# Patient Record
Sex: MALE | Race: BLACK OR AFRICAN AMERICAN | Employment: OTHER | URBAN - METROPOLITAN AREA
[De-identification: names, ages, dates, MRNs, and addresses within clinical notes are randomized per-mention and may not be internally consistent; named-entity substitution may affect disease eponyms.]

---

## 2017-02-06 ENCOUNTER — HOSPITAL ENCOUNTER (OUTPATIENT)
Dept: PREADMISSION TESTING | Age: 67
Discharge: HOME | End: 2017-02-06
Payer: MEDICARE

## 2017-02-06 VITALS
DIASTOLIC BLOOD PRESSURE: 64 MMHG | RESPIRATION RATE: 18 BRPM | WEIGHT: 191.14 LBS | BODY MASS INDEX: 28.31 KG/M2 | OXYGEN SATURATION: 99 % | TEMPERATURE: 97.8 F | HEIGHT: 69 IN | HEART RATE: 63 BPM | SYSTOLIC BLOOD PRESSURE: 132 MMHG

## 2017-02-06 LAB
ABO + RH BLD: NORMAL
ALBUMIN SERPL BCP-MCNC: 4 G/DL (ref 3.5–5)
ALBUMIN/GLOB SERPL: 1.3 {RATIO} (ref 1.1–2.2)
ALP SERPL-CCNC: 87 U/L (ref 45–117)
ALT SERPL-CCNC: 34 U/L (ref 12–78)
ANION GAP BLD CALC-SCNC: 6 MMOL/L (ref 5–15)
APPEARANCE UR: CLEAR
AST SERPL W P-5'-P-CCNC: 20 U/L (ref 15–37)
BACTERIA URNS QL MICRO: NEGATIVE /HPF
BASOPHILS # BLD AUTO: 0 K/UL (ref 0–0.1)
BASOPHILS # BLD: 0 % (ref 0–1)
BILIRUB SERPL-MCNC: 0.8 MG/DL (ref 0.2–1)
BILIRUB UR QL: NEGATIVE
BLOOD GROUP ANTIBODIES SERPL: NORMAL
BUN SERPL-MCNC: 17 MG/DL (ref 6–20)
BUN/CREAT SERPL: 12 (ref 12–20)
CALCIUM SERPL-MCNC: 8.9 MG/DL (ref 8.5–10.1)
CHLORIDE SERPL-SCNC: 105 MMOL/L (ref 97–108)
CO2 SERPL-SCNC: 31 MMOL/L (ref 21–32)
COLOR UR: ABNORMAL
CREAT SERPL-MCNC: 1.47 MG/DL (ref 0.7–1.3)
EOSINOPHIL # BLD: 0.1 K/UL (ref 0–0.4)
EOSINOPHIL NFR BLD: 3 % (ref 0–7)
EPITH CASTS URNS QL MICRO: ABNORMAL /LPF
ERYTHROCYTE [DISTWIDTH] IN BLOOD BY AUTOMATED COUNT: 13.4 % (ref 11.5–14.5)
GLOBULIN SER CALC-MCNC: 3.2 G/DL (ref 2–4)
GLUCOSE SERPL-MCNC: 83 MG/DL (ref 65–100)
GLUCOSE UR STRIP.AUTO-MCNC: NEGATIVE MG/DL
HCT VFR BLD AUTO: 46.1 % (ref 36.6–50.3)
HGB BLD-MCNC: 14.5 G/DL (ref 12.1–17)
HGB UR QL STRIP: NEGATIVE
KETONES UR QL STRIP.AUTO: NEGATIVE MG/DL
LEUKOCYTE ESTERASE UR QL STRIP.AUTO: ABNORMAL
LYMPHOCYTES # BLD AUTO: 28 % (ref 12–49)
LYMPHOCYTES # BLD: 1.3 K/UL (ref 0.8–3.5)
MCH RBC QN AUTO: 28.2 PG (ref 26–34)
MCHC RBC AUTO-ENTMCNC: 31.5 G/DL (ref 30–36.5)
MCV RBC AUTO: 89.7 FL (ref 80–99)
MONOCYTES # BLD: 0.6 K/UL (ref 0–1)
MONOCYTES NFR BLD AUTO: 12 % (ref 5–13)
NEUTS SEG # BLD: 2.7 K/UL (ref 1.8–8)
NEUTS SEG NFR BLD AUTO: 57 % (ref 32–75)
NITRITE UR QL STRIP.AUTO: NEGATIVE
PH UR STRIP: 6 [PH] (ref 5–8)
PLATELET # BLD AUTO: 270 K/UL (ref 150–400)
POTASSIUM SERPL-SCNC: 4.7 MMOL/L (ref 3.5–5.1)
PROT SERPL-MCNC: 7.2 G/DL (ref 6.4–8.2)
PROT UR STRIP-MCNC: NEGATIVE MG/DL
RBC # BLD AUTO: 5.14 M/UL (ref 4.1–5.7)
RBC #/AREA URNS HPF: ABNORMAL /HPF (ref 0–5)
SODIUM SERPL-SCNC: 142 MMOL/L (ref 136–145)
SP GR UR REFRACTOMETRY: 1.02 (ref 1–1.03)
SPECIMEN EXP DATE BLD: NORMAL
UA: UC IF INDICATED,UAUC: ABNORMAL
UROBILINOGEN UR QL STRIP.AUTO: 1 EU/DL (ref 0.2–1)
WBC # BLD AUTO: 4.8 K/UL (ref 4.1–11.1)
WBC URNS QL MICRO: ABNORMAL /HPF (ref 0–4)

## 2017-02-06 PROCEDURE — 80053 COMPREHEN METABOLIC PANEL: CPT | Performed by: UROLOGY

## 2017-02-06 PROCEDURE — 36415 COLL VENOUS BLD VENIPUNCTURE: CPT | Performed by: UROLOGY

## 2017-02-06 PROCEDURE — 85025 COMPLETE CBC W/AUTO DIFF WBC: CPT | Performed by: UROLOGY

## 2017-02-06 PROCEDURE — 86900 BLOOD TYPING SEROLOGIC ABO: CPT | Performed by: UROLOGY

## 2017-02-06 PROCEDURE — 81001 URINALYSIS AUTO W/SCOPE: CPT | Performed by: UROLOGY

## 2017-02-06 NOTE — PERIOP NOTES
[facility]  PREOPERATIVE INSTRUCTIONS    Surgery Date:   Monday, 2/13/17  Surgery arrival time given by surgeon: NO   If no,[facility] [address] staff will call you between 4 PM- 8 PM the day before surgery with your arrival time. If your surgery is on a Monday, we will call you the preceding Friday. Please call [phone] after 8 PM if you did not receive your arrival time. 1. Please report at the designated time to the [address] [place] [address] [facility]. Bring your insurance card, photo identification, and any copayment ( if applicable). 2. You must have a responsible adult to drive you home. You need to have a responsible adult to stay with you the first 24 hours after surgery if you are going home the same day of your surgery and you should not drive a car for 24 hours following your surgery. 3. Nothing to eat or drink after midnight the night before surgery. This includes no water, gum, mints, coffee, juice, etc.  Please note special instructions, if applicable, below for medications. 4. MEDICATIONS TO TAKE THE MORNING OF SURGERY WITH A SIP OF WATER: NONE  5. No alcoholic beverages 24 hours before or after your surgery. 6. If you are being admitted to the hospital,please leave personal belongings/luggage in your car until you have an assigned hospital room number. 7. Stop Aspirin (continue as instructed) and/or any non-steroidal anti-inflammatory drugs (i.e. Ibuprofen, Naproxen, Advil, Aleve) as directed by your surgeon. You may take Tylenol. Stop herbal supplements 1 week prior to  surgery. 8. If you are currently taking Plavix, Coumadin,or any other blood-thinning/anticoagulant medication contact your surgeon for instructions. 9. Please wear comfortable clothes. Wear your glasses instead of contacts. We ask that all money, jewelry and valuables be left at home. Wear no make up, particularly mascara, the day of surgery. 10.  All body piercings, rings,and jewelry need to be removed and left at home.     Please wear your hair loose or down. Please no pony-tails, buns, or any metal hair accessories. If you shower the morning of surgery, please do not apply any lotions, powders, or deodorants afterwards. Do not shave any body area within 24 hours of your surgery. 11. Please follow all instructions to avoid any potential surgical cancellation. 12.  Should your physical condition change, (i.e. fever, cold, flu, etc.) please notify your surgeon as soon as possible. 13. It is important to be on time. If a situation occurs where you may be delayed, please call:  [phone] / [phone] on the day of surgery. 14. The [facility] staff can be reached at [phone]. .  15. Special instructions: pain scale, free  parking 7-5 pm, BRING medication list with last dose taken, Bring cpap to hospital    The patient and spouse was contacted  in person. He  verbalize  understanding of all instructions does not  need reinforcement.

## 2017-02-06 NOTE — H&P
PAT Pre-Op History & Physical    Patient: Carolina Islas                  MRN: [id number]          SSN: [id number]  YOB: 1950          Age: 77 y.o. Sex: male                Subjective:   Patient is a 77 y.o.  male who presents with a diagnosis of prostate cancer 10/28/16 following biopsy. Pre treatment PSA 6.3ng/ml. Complaint of nocturia and mild erectile dysfunction per urology note. The patient was evaluated in the surgeon's office and it was determined that the most appropriate plan of care is to proceed with surgical intervention. Patient's PCP Queta Jerry MD    Followed by cardiology for history of CAD, s/p coronary stent to LAD 1999 and 2009. Note- Dr. Ariana Benjamin in [facility], with Plavix and aspirin plan. Recommended stopping plavix 5-7 days prior and remaining on aspirin. Dr. Chikis Randolph notified and in agreement. Note from Dr. Chikis Randolph to stop Plavix 7 days and remain on aspirin on chart. Past Medical History   Diagnosis Date    CAD (coronary artery disease) 1999, 2009     hx of coronary stent- singe vessel- LAD    Cancer ([facility] [address]      prostate    Essential hypertension     GERD (gastroesophageal reflux disease)     Hyperlipidemia     Sleep apnea      CPAP complaint      Past Surgical History   Procedure Laterality Date    Hx ptca       Stent of the LAD in 1999 and again in 2009 with a 3 x 28 mm on 12/21/09 post dilated to 3.25    Hx heart catheterization  1999, & 12/21/2009     LAD    Hx urological  10/2016     prostate biopsy    Hx cataract removal Bilateral       Prior to Admission medications    Medication Sig Start Date End Date Taking? Authorizing Provider   enalapril (VASOTEC) 2.5 mg tablet Take 1 Tab by mouth daily. 10/31/16  Yes Awais Vargas MD   simvastatin (ZOCOR) 20 mg tablet Take 1 Tab by mouth nightly. 10/31/16  Yes Awais Vargas MD   clopidogrel (PLAVIX) 75 mg tablet Take 1 Tab by mouth daily.  10/31/16  Yes Xuan Sellers MD [facility]   pantoprazole (PROTONIX) 40 mg tablet Take 40 mg by mouth daily (with breakfast). Patient takes with food, breakfast   Yes Historical Provider   nitroglycerin (NITROSTAT) 0.4 mg SL tablet 1 Tab by SubLINGual route every five (5) minutes as needed. 12/8/10  Yes Daren Velez MD   aspirin 81 mg tablet Take  by mouth daily. Yes Historical Provider     Current Outpatient Prescriptions   Medication Sig    enalapril (VASOTEC) 2.5 mg tablet Take 1 Tab by mouth daily.  simvastatin (ZOCOR) 20 mg tablet Take 1 Tab by mouth nightly.  clopidogrel (PLAVIX) 75 mg tablet Take 1 Tab by mouth daily.  pantoprazole (PROTONIX) 40 mg tablet Take 40 mg by mouth daily (with breakfast). Patient takes with food, breakfast    nitroglycerin (NITROSTAT) 0.4 mg SL tablet 1 Tab by SubLINGual route every five (5) minutes as needed.  aspirin 81 mg tablet Take  by mouth daily. No current facility-administered medications for this encounter. No Known Allergies   Social History   Substance Use Topics    Smoking status: Former Smoker     Packs/day: 1.00     Years: 30.00     Quit date: 1999    Smokeless tobacco: Never Used    Alcohol use No      Comment: none since 1999      History   Drug Use No     Comment: none since 1999- hx of casual use marijuana and cocaine     Family History   Problem Relation Age of Onset    Hypertension Mother     Hypertension Father     Heart Disease Father 79     MI    Hypertension Sister     Hypertension Brother     Hypertension Brother         Review of Systems    Patient denies visual disturbances, mouth sores, and loose teeth. Denies fever, chills and sweats. Denies cough, shortness of breath, and wheezes. Denies chest pain, tightness, pain radiating down left arm, palpitations, dizziness, and lightheadedness,  Denies diarrhea, constipation, and abdominal pain. Complaint of intermittent hesitancy, otherwise denies urgency or incontinence.  Denies joint pain, back pain and weakness. Denies skin breakdown, rashes, insect bites or open area. Denies excessive urination, excessive thirst, fatigue and malaise. Objective:     Patient Vitals for the past 24 hrs:   Temp Pulse Resp BP SpO2   17 1003 97.8 °F (36.6 °C) 63 18 132/64 99 %     Wt Readings from Last 1 Encounters:   17 86.7 kg (191 lb 2.2 oz)     Body mass index is 28.23 kg/(m^2). Temp (24hrs), Av.8 °F (36.6 °C), Min:97.8 °F (36.6 °C), Max:97.8 °F (36.6 °C)      Physical Exam:     General: Pleasant, cooperative, no apparent distress, appears stated age. Eyes: Conjunctivae/corneas clear. EOMs intact. Nose: Nares normal.   Mouth/Throat: Lips, mucosa, and tongue normal. Upper and lower partials in place. Neck: Supple, symmetrical, trachea midline. No carotid bruits. Normal ROM in neck. Back: Symmetric. Lungs: Clear to auscultation bilaterally. Heart: Regular rate and rhythm, S1, S2 normal. No murmur, click, rub or gallop. Abdomen: Soft, non-tender. No distension. Bowel sounds normal.       Musculoskeletal:  Normal strength in all 4 extremities. Extremities:  Extremities normal, atraumatic, no cyanosis or edema. Calves supple, non tender to palpation. Pulses: 2+ and symmetric bilateral upper extremities. Cap. refill <2 seconds   Skin: Skin color, texture, turgor normal.  No rashes or lesions. Lymph nodes: No adenopathy. Neurologic: Alert and oriented to person, place and time. CN II-XII grossly intact.       Labs:   Recent Results (from the past 72 hour(s))   CBC WITH AUTOMATED DIFF    Collection Time: 17 10:50 AM   Result Value Ref Range    WBC 4.8 4.1 - 11.1 K/uL    RBC 5.14 4.10 - 5.70 M/uL    HGB 14.5 12.1 - 17.0 g/dL    HCT 46.1 36.6 - 50.3 %    MCV 89.7 80.0 - 99.0 FL    MCH 28.2 26.0 - 34.0 PG    MCHC 31.5 30.0 - 36.5 g/dL    RDW 13.4 11.5 - 14.5 %    PLATELET 101 583 - 557 K/uL    NEUTROPHILS 57 32 - 75 %    LYMPHOCYTES 28 12 - 49 %    MONOCYTES 12 5 - 13 %    EOSINOPHILS 3 0 - 7 %    BASOPHILS 0 0 - 1 %    ABS. NEUTROPHILS 2.7 1.8 - 8.0 K/UL    ABS. LYMPHOCYTES 1.3 0.8 - 3.5 K/UL    ABS. MONOCYTES 0.6 0.0 - 1.0 K/UL    ABS. EOSINOPHILS 0.1 0.0 - 0.4 K/UL    ABS. BASOPHILS 0.0 0.0 - 0.1 K/UL   METABOLIC PANEL, COMPREHENSIVE    Collection Time: 02/06/17 10:50 AM   Result Value Ref Range    Sodium 142 136 - 145 mmol/L    Potassium 4.7 3.5 - 5.1 mmol/L    Chloride 105 97 - 108 mmol/L    CO2 31 21 - 32 mmol/L    Anion gap 6 5 - 15 mmol/L    Glucose 83 65 - 100 mg/dL    BUN 17 6 - 20 MG/DL    Creatinine 1.47 (H) 0.70 - 1.30 MG/DL    BUN/Creatinine ratio 12 12 - 20      GFR est AA 58 (L) >60 ml/min/1.73m2    GFR est non-AA 48 (L) >60 ml/min/1.73m2    Calcium 8.9 8.5 - 10.1 MG/DL    Bilirubin, total 0.8 0.2 - 1.0 MG/DL    ALT (SGPT) 34 12 - 78 U/L    AST (SGOT) 20 15 - 37 U/L    Alk. phosphatase 87 45 - 117 U/L    Protein, total 7.2 6.4 - 8.2 g/dL    Albumin 4.0 3.5 - 5.0 g/dL    Globulin 3.2 2.0 - 4.0 g/dL    A-G Ratio 1.3 1.1 - 2.2     URINALYSIS W/ REFLEX CULTURE    Collection Time: 02/06/17 10:50 AM   Result Value Ref Range    Color YELLOW/STRAW      Appearance CLEAR CLEAR      Specific gravity 1.023 1.003 - 1.030      pH (UA) 6.0 5.0 - 8.0      Protein NEGATIVE  NEG mg/dL    Glucose NEGATIVE  NEG mg/dL    Ketone NEGATIVE  NEG mg/dL    Bilirubin NEGATIVE  NEG      Blood NEGATIVE  NEG      Urobilinogen 1.0 0.2 - 1.0 EU/dL    Nitrites NEGATIVE  NEG      Leukocyte Esterase SMALL (A) NEG      WBC 0-4 0 - 4 /hpf    RBC 0-5 0 - 5 /hpf    Epithelial cells FEW FEW /lpf    Bacteria NEGATIVE  NEG /hpf    UA:UC IF INDICATED CULTURE NOT INDICATED BY UA RESULT CNI     TYPE & SCREEN    Collection Time: 02/06/17 10:50 AM   Result Value Ref Range    Crossmatch Expiration 02/16/2017     ABO/Rh(D) B POSITIVE     Antibody screen NEG        Assessment:     PROSTATE CA     Sleep apnea    Plan:     Scheduled for -DAVINCI RADICAL PROSTATECTOMY PELVIC LYMPH NODE DISSECTION    Labs done per surgeon's orders.  Labs reviewed, unremarkable  except- creatinine elevated at 1.47, GFR slightly decreased at 58. Prior labs available in [facility] show creatinine 1.3 and 1.4 in 2009, abnormal labs faxed to surgeon- See PAT nurse Documentation. EKG done 10/20/16 with cardiology review- interpretation, Dr. Sharad Becerra, in note 10/20/17 in [facility]. Plavix/ aspirin plan on chart- to stop plavix 7 days prior and continue on aspirin. Per cardiology and Dr. Sun Catalan in agreement- note on chart.      Advised to bring CPAP Kunal 33, NP

## 2017-02-12 ENCOUNTER — ANESTHESIA EVENT (OUTPATIENT)
Dept: SURGERY | Age: 67
DRG: 708 | End: 2017-02-12
Payer: MEDICARE

## 2017-02-13 ENCOUNTER — ANESTHESIA (OUTPATIENT)
Dept: SURGERY | Age: 67
DRG: 708 | End: 2017-02-13
Payer: MEDICARE

## 2017-02-13 PROBLEM — C61 PROSTATE CANCER (HCC): Status: ACTIVE | Noted: 2017-02-13

## 2017-02-13 PROCEDURE — 77030019908 HC STETH ESOPH SIMS -A: Performed by: ANESTHESIOLOGY

## 2017-02-13 PROCEDURE — 77030008771 HC TU NG SALEM SUMP -A: Performed by: ANESTHESIOLOGY

## 2017-02-13 PROCEDURE — 74011000258 HC RX REV CODE- 258

## 2017-02-13 PROCEDURE — 77030026438 HC STYL ET INTUB CARD -A: Performed by: ANESTHESIOLOGY

## 2017-02-13 PROCEDURE — 77030008684 HC TU ET CUF COVD -B: Performed by: ANESTHESIOLOGY

## 2017-02-13 PROCEDURE — 77030013079 HC BLNKT BAIR HGGR 3M -A: Performed by: ANESTHESIOLOGY

## 2017-02-13 PROCEDURE — 74011250636 HC RX REV CODE- 250/636

## 2017-02-13 PROCEDURE — 74011000250 HC RX REV CODE- 250

## 2017-02-13 PROCEDURE — 74011250636 HC RX REV CODE- 250/636: Performed by: ANESTHESIOLOGY

## 2017-02-13 RX ORDER — SUCCINYLCHOLINE CHLORIDE 20 MG/ML
INJECTION INTRAMUSCULAR; INTRAVENOUS AS NEEDED
Status: DISCONTINUED | OUTPATIENT
Start: 2017-02-13 | End: 2017-02-13 | Stop reason: HOSPADM

## 2017-02-13 RX ORDER — GLYCOPYRROLATE 0.2 MG/ML
INJECTION INTRAMUSCULAR; INTRAVENOUS AS NEEDED
Status: DISCONTINUED | OUTPATIENT
Start: 2017-02-13 | End: 2017-02-13 | Stop reason: HOSPADM

## 2017-02-13 RX ORDER — PROPOFOL 10 MG/ML
INJECTION, EMULSION INTRAVENOUS AS NEEDED
Status: DISCONTINUED | OUTPATIENT
Start: 2017-02-13 | End: 2017-02-13 | Stop reason: HOSPADM

## 2017-02-13 RX ORDER — NEOSTIGMINE METHYLSULFATE 1 MG/ML
INJECTION INTRAVENOUS AS NEEDED
Status: DISCONTINUED | OUTPATIENT
Start: 2017-02-13 | End: 2017-02-13 | Stop reason: HOSPADM

## 2017-02-13 RX ORDER — CEFAZOLIN SODIUM IN 0.9 % NACL 2 G/100 ML
PLASTIC BAG, INJECTION (ML) INTRAVENOUS AS NEEDED
Status: DISCONTINUED | OUTPATIENT
Start: 2017-02-13 | End: 2017-02-13 | Stop reason: HOSPADM

## 2017-02-13 RX ORDER — DEXAMETHASONE SODIUM PHOSPHATE 4 MG/ML
INJECTION, SOLUTION INTRA-ARTICULAR; INTRALESIONAL; INTRAMUSCULAR; INTRAVENOUS; SOFT TISSUE AS NEEDED
Status: DISCONTINUED | OUTPATIENT
Start: 2017-02-13 | End: 2017-02-13 | Stop reason: HOSPADM

## 2017-02-13 RX ORDER — LIDOCAINE HYDROCHLORIDE 20 MG/ML
INJECTION, SOLUTION EPIDURAL; INFILTRATION; INTRACAUDAL; PERINEURAL AS NEEDED
Status: DISCONTINUED | OUTPATIENT
Start: 2017-02-13 | End: 2017-02-13 | Stop reason: HOSPADM

## 2017-02-13 RX ORDER — FENTANYL CITRATE 50 UG/ML
INJECTION, SOLUTION INTRAMUSCULAR; INTRAVENOUS AS NEEDED
Status: DISCONTINUED | OUTPATIENT
Start: 2017-02-13 | End: 2017-02-13 | Stop reason: HOSPADM

## 2017-02-13 RX ORDER — SODIUM CHLORIDE, SODIUM LACTATE, POTASSIUM CHLORIDE, CALCIUM CHLORIDE 600; 310; 30; 20 MG/100ML; MG/100ML; MG/100ML; MG/100ML
INJECTION, SOLUTION INTRAVENOUS
Status: DISCONTINUED | OUTPATIENT
Start: 2017-02-13 | End: 2017-02-13 | Stop reason: HOSPADM

## 2017-02-13 RX ORDER — ONDANSETRON 2 MG/ML
INJECTION INTRAMUSCULAR; INTRAVENOUS AS NEEDED
Status: DISCONTINUED | OUTPATIENT
Start: 2017-02-13 | End: 2017-02-13 | Stop reason: HOSPADM

## 2017-02-13 RX ORDER — ROCURONIUM BROMIDE 10 MG/ML
INJECTION, SOLUTION INTRAVENOUS AS NEEDED
Status: DISCONTINUED | OUTPATIENT
Start: 2017-02-13 | End: 2017-02-13 | Stop reason: HOSPADM

## 2017-02-13 RX ORDER — MIDAZOLAM HYDROCHLORIDE 1 MG/ML
INJECTION, SOLUTION INTRAMUSCULAR; INTRAVENOUS AS NEEDED
Status: DISCONTINUED | OUTPATIENT
Start: 2017-02-13 | End: 2017-02-13 | Stop reason: HOSPADM

## 2017-02-13 RX ADMIN — GLYCOPYRROLATE 0.3 MG: 0.2 INJECTION INTRAMUSCULAR; INTRAVENOUS at 16:01

## 2017-02-13 RX ADMIN — ROCURONIUM BROMIDE 10 MG: 10 INJECTION, SOLUTION INTRAVENOUS at 14:26

## 2017-02-13 RX ADMIN — FENTANYL CITRATE 50 MCG: 50 INJECTION, SOLUTION INTRAMUSCULAR; INTRAVENOUS at 13:50

## 2017-02-13 RX ADMIN — SODIUM CHLORIDE, SODIUM LACTATE, POTASSIUM CHLORIDE, CALCIUM CHLORIDE: 600; 310; 30; 20 INJECTION, SOLUTION INTRAVENOUS at 12:41

## 2017-02-13 RX ADMIN — SODIUM CHLORIDE, SODIUM LACTATE, POTASSIUM CHLORIDE, AND CALCIUM CHLORIDE: 600; 310; 30; 20 INJECTION, SOLUTION INTRAVENOUS at 15:26

## 2017-02-13 RX ADMIN — LIDOCAINE HYDROCHLORIDE 100 MG: 20 INJECTION, SOLUTION EPIDURAL; INFILTRATION; INTRACAUDAL; PERINEURAL at 13:20

## 2017-02-13 RX ADMIN — FENTANYL CITRATE 100 MCG: 50 INJECTION, SOLUTION INTRAMUSCULAR; INTRAVENOUS at 13:20

## 2017-02-13 RX ADMIN — ROCURONIUM BROMIDE 10 MG: 10 INJECTION, SOLUTION INTRAVENOUS at 13:20

## 2017-02-13 RX ADMIN — FENTANYL CITRATE 50 MCG: 50 INJECTION, SOLUTION INTRAMUSCULAR; INTRAVENOUS at 16:01

## 2017-02-13 RX ADMIN — ROCURONIUM BROMIDE 20 MG: 10 INJECTION, SOLUTION INTRAVENOUS at 13:31

## 2017-02-13 RX ADMIN — MIDAZOLAM HYDROCHLORIDE 2 MG: 1 INJECTION, SOLUTION INTRAMUSCULAR; INTRAVENOUS at 13:13

## 2017-02-13 RX ADMIN — ROCURONIUM BROMIDE 10 MG: 10 INJECTION, SOLUTION INTRAVENOUS at 14:55

## 2017-02-13 RX ADMIN — Medication 2 G: at 13:37

## 2017-02-13 RX ADMIN — NEOSTIGMINE METHYLSULFATE 2 MG: 1 INJECTION INTRAVENOUS at 16:01

## 2017-02-13 RX ADMIN — ONDANSETRON 4 MG: 2 INJECTION INTRAMUSCULAR; INTRAVENOUS at 13:13

## 2017-02-13 RX ADMIN — FENTANYL CITRATE 50 MCG: 50 INJECTION, SOLUTION INTRAMUSCULAR; INTRAVENOUS at 16:02

## 2017-02-13 RX ADMIN — DEXAMETHASONE SODIUM PHOSPHATE 8 MG: 4 INJECTION, SOLUTION INTRA-ARTICULAR; INTRALESIONAL; INTRAMUSCULAR; INTRAVENOUS; SOFT TISSUE at 13:13

## 2017-02-13 RX ADMIN — SUCCINYLCHOLINE CHLORIDE 100 MG: 20 INJECTION INTRAMUSCULAR; INTRAVENOUS at 13:20

## 2017-02-13 RX ADMIN — ROCURONIUM BROMIDE 10 MG: 10 INJECTION, SOLUTION INTRAVENOUS at 14:00

## 2017-02-13 RX ADMIN — PROPOFOL 200 MG: 10 INJECTION, EMULSION INTRAVENOUS at 13:20

## 2017-02-13 NOTE — ANESTHESIA PREPROCEDURE EVALUATION
Anesthetic History   No history of anesthetic complications            Review of Systems / Medical History  Patient summary reviewed, nursing notes reviewed and pertinent labs reviewed    Pulmonary        Sleep apnea           Neuro/Psych   Within defined limits           Cardiovascular    Hypertension          CAD and cardiac stents    Exercise tolerance: >4 METS     GI/Hepatic/Renal     GERD           Endo/Other        Cancer     Other Findings   Comments: Prostate ca         Physical Exam    Airway  Mallampati: II    Neck ROM: normal range of motion   Mouth opening: Normal     Cardiovascular  Regular rate and rhythm,  S1 and S2 normal,  no murmur, click, rub, or gallop  Rhythm: regular  Rate: normal         Dental    Dentition: Upper partial plate and Lower partial plate     Pulmonary  Breath sounds clear to auscultation               Abdominal  GI exam deferred       Other Findings            Anesthetic Plan    ASA: 3  Anesthesia type: general          Induction: Intravenous  Anesthetic plan and risks discussed with: Patient

## 2017-02-13 NOTE — ANESTHESIA POSTPROCEDURE EVALUATION
Post-Anesthesia Evaluation and Assessment    Patient: Kendy Thompson MRN: [id number]  SSN: [id number]    YOB: 1950  Age: 77 y.o. Sex: male       Cardiovascular Function/Vital Signs  Visit Vitals    /65    Pulse 80    Temp 36.4 °C (97.5 °F)    Resp 16    Ht 5' 9\" (1.753 m)    Wt 87 kg (191 lb 12.8 oz)    SpO2 100%    BMI 28.32 kg/m2       Patient is status post general anesthesia for Procedure(s):  [address], BILATERAL PELVIC LYMPH NODE DISSECTION. Nausea/Vomiting: None    Postoperative hydration reviewed and adequate. Pain:  Pain Scale 1: Numeric (0 - 10) (02/13/17 1738)  Pain Intensity 1: 3 (02/13/17 1738)   Managed    Neurological Status:   Neuro (WDL): Exceptions to WDL (02/13/17 1635)  Neuro  Neurologic State: Drowsy; Eyes open spontaneously (02/13/17 1635)   At baseline    Mental Status and Level of Consciousness: Arousable    Pulmonary Status:   O2 Device: Nasal cannula (02/13/17 1635)   Adequate oxygenation and airway patent    Complications related to anesthesia: None    Post-anesthesia assessment completed.  No concerns    Signed By: Sunita Meade DO     February 13, 2017

## 2017-05-11 ENCOUNTER — OFFICE VISIT (OUTPATIENT)
Dept: CARDIOLOGY CLINIC | Age: 67
End: 2017-05-11

## 2017-05-11 VITALS
OXYGEN SATURATION: 98 % | HEIGHT: 69 IN | BODY MASS INDEX: 27.67 KG/M2 | WEIGHT: 186.8 LBS | SYSTOLIC BLOOD PRESSURE: 130 MMHG | HEART RATE: 64 BPM | DIASTOLIC BLOOD PRESSURE: 62 MMHG

## 2017-05-11 DIAGNOSIS — I10 ESSENTIAL HYPERTENSION, BENIGN: ICD-10-CM

## 2017-05-11 DIAGNOSIS — E78.00 PURE HYPERCHOLESTEROLEMIA: Primary | ICD-10-CM

## 2017-05-11 DIAGNOSIS — I25.10 ATHEROSCLEROSIS OF NATIVE CORONARY ARTERY OF NATIVE HEART WITHOUT ANGINA PECTORIS: ICD-10-CM

## 2017-05-11 RX ORDER — SIMVASTATIN 20 MG/1
20 TABLET, FILM COATED ORAL
Qty: 90 TAB | Refills: 3 | Status: SHIPPED | OUTPATIENT
Start: 2017-05-11 | End: 2018-04-20 | Stop reason: SDUPTHER

## 2017-05-11 RX ORDER — CLOPIDOGREL BISULFATE 75 MG/1
75 TABLET ORAL DAILY
Qty: 90 TAB | Refills: 3 | Status: SHIPPED | OUTPATIENT
Start: 2017-05-11 | End: 2018-04-20 | Stop reason: SDUPTHER

## 2017-05-11 RX ORDER — ENALAPRIL MALEATE 2.5 MG/1
2.5 TABLET ORAL DAILY
Qty: 90 TAB | Refills: 3 | Status: SHIPPED | OUTPATIENT
Start: 2017-05-11 | End: 2018-04-20 | Stop reason: SDUPTHER

## 2017-05-11 RX ORDER — NITROGLYCERIN 0.4 MG/1
0.4 TABLET SUBLINGUAL
Qty: 25 TAB | Refills: 3 | Status: SHIPPED | OUTPATIENT
Start: 2017-05-11 | End: 2019-06-13 | Stop reason: SDUPTHER

## 2017-05-11 NOTE — PROGRESS NOTES
LAST OFFICE VISIT : 10/20/2016        ICD-10-CM ICD-9-CM   1. Pure hypercholesterolemia E78.00 272.0   2. Atherosclerosis of native coronary artery of native heart without angina pectoris I25.10 414.01   3. Essential hypertension, benign I10 401.1            Jarrell Daley is a 79 y.o. male with HTN, dyslipidemia, CAD s/p stenting  referred for follow up evaluation. Cardiac risk factors: dyslipidemia, male gender, hypertension  I have personally obtained the history from the patient. HISTORY OF PRESENTING ILLNESS      Mr. Anne Marie Shaw is doing well with no interval issues. He is normotensive at home. Adherent with BP medications. Denies any lightheadedness or dizziness. He remains active with no exertional symptoms. He does not pursue any structured exercise. Denies any exertional symptoms. The patient denies chest pain/ shortness of breath, orthopnea, PND, LE edema, palpitations, syncope, presyncope or fatigue.            ACTIVE PROBLEM LIST     Patient Active Problem List    Diagnosis Date Noted    Prostate cancer ([facility] 02/13/2017    Coronary atherosclerosis of native coronary artery 10/15/2010    Essential hypertension, benign 10/15/2010    Pure hypercholesterolemia 10/15/2010           PAST MEDICAL HISTORY     Past Medical History:   Diagnosis Date    CAD (coronary artery disease) 1999, 2009    hx of coronary stent- singe vessel- LAD    Cancer ([facility] [address]     prostate    Essential hypertension     GERD (gastroesophageal reflux disease)     Hyperlipidemia     Sleep apnea     CPAP complaint           PAST SURGICAL HISTORY     Past Surgical History:   Procedure Laterality Date    HX CATARACT REMOVAL Bilateral     HX HEART CATHETERIZATION  1999, & 12/21/2009    LAD    HX PTCA      Stent of the LAD in 1999 and again in 2009 with a 3 x 28 mm on 12/21/09 post dilated to 3.25    HX UROLOGICAL  10/2016    prostate biopsy          ALLERGIES     No Known Allergies       FAMILY HISTORY     Family History   Problem Relation Age of Onset    Hypertension Mother     Hypertension Father     Heart Disease Father 79     MI    Hypertension Sister     Hypertension Brother     Hypertension Brother     negative for cardiac disease       SOCIAL HISTORY     Social History     Social History    Marital status:      Spouse name: N/A    Number of children: N/A    Years of education: N/A     Social History Main Topics    Smoking status: Former Smoker     Packs/day: 1.00     Years: 30.00     Quit date: 1999    Smokeless tobacco: Never Used    Alcohol use No    Drug use: No      Comment: none since 1999- hx of casual use marijuana and cocaine    Sexual activity: Not Asked     Other Topics Concern    None     Social History Narrative         MEDICATIONS     Current Outpatient Prescriptions   Medication Sig    enalapril (VASOTEC) 2.5 mg tablet Take 1 Tab by mouth daily.  simvastatin (ZOCOR) 20 mg tablet Take 1 Tab by mouth nightly.  clopidogrel (PLAVIX) 75 mg tablet Take 1 Tab by mouth daily.  pantoprazole (PROTONIX) 40 mg tablet Take 40 mg by mouth daily (with breakfast). Patient takes with food, breakfast    nitroglycerin (NITROSTAT) 0.4 mg SL tablet 1 Tab by SubLINGual route every five (5) minutes as needed.  aspirin 81 mg tablet Take  by mouth daily. No current facility-administered medications for this visit. I have reviewed the nurses notes, vitals, problem list, allergy list, medical history, family, social history and medications. REVIEW OF SYMPTOMS      General: Pt denies excessive weight gain or loss. Pt is able to conduct ADL's  HEENT: Denies blurred vision, headaches, hearing loss, epistaxis and difficulty swallowing. Respiratory: Denies cough, congestion, shortness of breath, DELONG, wheezing or stridor.   Cardiovascular: Denies precordial pain, palpitations, edema or PND  Gastrointestinal: Denies poor appetite, indigestion, abdominal pain or blood in stool  Genitourinary: Denies hematuria, dysuria, increased urinary frequency  Musculoskeletal: Denies joint pain or swelling from muscles or joints  Neurologic: Denies tremor, paresthesias, headache, or sensory motor disturbance  Psychiatric: Denies confusion, insomnia, depression  Integumentray: Denies rash, itching or ulcers. Hematologic: Denies easy bruising, bleeding     PHYSICAL EXAMINATION      Vitals:    05/11/17 0943   BP: 130/62   Pulse: 64   SpO2: 98%   Weight: 186 lb 12.8 oz (84.7 kg)   Height: 5' 9\" (1.753 m)     General: Well developed, in no acute distress. HEENT: No jaundice, oral mucosa moist, no oral ulcers  Neck: Supple, no stiffness, no lymphadenopathy, supple  Heart:  Normal S1/S2 negative S3 or S4. Regular, no murmur, gallop or rub, no jugular venous distention  Respiratory: Clear bilaterally x 4, no wheezing or rales  Abdomen:   Soft, non-tender, bowel sounds are active.   Extremities:  No edema, normal cap refill, no cyanosis. Musculoskeletal: No clubbing, no deformities  Neuro: A&Ox3, speech clear, gait stable, cooperative, no focal neurologic deficits  Skin: Skin color is normal. No rashes or lesions. Non diaphoretic, moist.  Vascular: 2+ pulses symmetric in all extremities         DIAGNOSTIC DATA   1. Echo  4/5/06 - EF 55%, RVSP 31 mmHg    2. Myocardial perfusion study  12/3/09 - Abnormal myocardial perfusion of the LAD territory with EF 55%    3. Cardiac catheterization  12/21/09 - Normal hemodynamics, Single vessel coronary artery disease of the LAD, EF 50% with regional wall motion abnormalities. LAD (99% in stent restenosis from 1999),RCA(insig),LCX(insig)    4.  Lipids  4/14/15 - , HDL 34, LDL 76, TG 96  10/20/16- , HDL 39, LDL 76, TG 79       LABORATORY DATA            Lab Results   Component Value Date/Time    WBC 11.3 02/14/2017 03:15 AM    HGB 13.7 02/14/2017 03:15 AM    HCT 41.8 02/14/2017 03:15 AM    PLATELET 625 21/25/3726 03:15 AM    MCV 87.6 02/14/2017 03:15 AM Lab Results   Component Value Date/Time    Sodium 140 02/14/2017 03:15 AM    Potassium 4.1 02/14/2017 03:15 AM    Chloride 105 02/14/2017 03:15 AM    CO2 26 02/14/2017 03:15 AM    Anion gap 9 02/14/2017 03:15 AM    Glucose 130 02/14/2017 03:15 AM    BUN 12 02/14/2017 03:15 AM    Creatinine 1.48 02/14/2017 03:15 AM    BUN/Creatinine ratio 8 02/14/2017 03:15 AM    GFR est AA 58 02/14/2017 03:15 AM    GFR est non-AA 48 02/14/2017 03:15 AM    Calcium 8.1 02/14/2017 03:15 AM    Bilirubin, total 0.8 02/06/2017 10:50 AM    AST (SGOT) 20 02/06/2017 10:50 AM    Alk. phosphatase 87 02/06/2017 10:50 AM    Protein, total 7.2 02/06/2017 10:50 AM    Albumin 4.0 02/06/2017 10:50 AM    Globulin 3.2 02/06/2017 10:50 AM    A-G Ratio 1.3 02/06/2017 10:50 AM    ALT (SGPT) 34 02/06/2017 10:50 AM           ASSESSMENT/RECOMMENDATIONS:.      1. CAD s/p stenting  -He's doing well with no chest discomfort. No exercising regularly, and we had a long discussion about the importance of exercise for better CAD control.   -Continue risk factor modification. No cardiac testing needed at this time. 2. HTN  - BP under good control on current medical regimen. No adjustments to antihypertensives. 3. Dyslipidemia  -Lipids have been at goal, but have not been checked recently. I have ordered cholesterol profile on him. 4. Return in 6 months or sooner PRN. No orders of the defined types were placed in this encounter. Follow-up Disposition:  Return in about 6 months (around 11/11/2017). I have discussed the diagnosis with  Cole Mishra and the intended plan as seen in the above orders. Questions were answered concerning future plans. I have discussed medication side effects and warnings with the patient as well. Thank you,  Acacia Olmedo MD for involving me in the care of  Cole Mishra. Please do not hesitate to contact me for further questions/concerns.      Written by Gaby Islas, as dictated by Jenni Disla MD.    Armando Castleman. MD [facility], [address]      [place] [facility], [address] [place] [address] Drive      [phone] / [phone] Fax

## 2017-05-11 NOTE — PROGRESS NOTES
Medication changes are per verbal order of Dr. Brijesh Virgen.    Visit Vitals    /62 (BP 1 Location: Left arm)    Pulse 64    Ht 5' 9\" (1.753 m)    Wt 186 lb 12.8 oz (84.7 kg)    SpO2 98%    BMI 27.59 kg/m2

## 2017-05-11 NOTE — MR AVS SNAPSHOT
Visit Information Date & Time Provider Department Dept. Phone Encounter #  
 5/11/2017  9:40 AM Diann Meza MD [facility] [phone] [id number] Follow-up Instructions Return in about 6 months (around 11/11/2017). Follow-up and Disposition History Upcoming Health Maintenance Date Due Hepatitis C Screening 1950 DTaP/Tdap/Td series (1 - Tdap) 4/6/1971 FOBT Q 1 YEAR AGE 50-75 4/6/2000 ZOSTER VACCINE AGE 60> 4/6/2010 GLAUCOMA SCREENING Q2Y 4/6/2015 Pneumococcal 65+ High/Highest Risk (1 of 2 - PCV13) 4/6/2015 MEDICARE YEARLY EXAM 4/6/2015 INFLUENZA AGE 9 TO ADULT 8/1/2017 Allergies as of 5/11/2017  Review Complete On: 5/11/2017 By: Diann Meza MD  
 No Known Allergies Current Immunizations  Never Reviewed No immunizations on file. Not reviewed this visit You Were Diagnosed With   
  
 Codes Comments Pure hypercholesterolemia    -  Primary ICD-10-CM: E78.00 ICD-9-CM: 272.0 Atherosclerosis of native coronary artery of native heart without angina pectoris     ICD-10-CM: I25.10 ICD-9-CM: 414.01 Essential hypertension, benign     ICD-10-CM: I10 
ICD-9-CM: 401.1 Vitals BP Pulse Height(growth percentile) Weight(growth percentile) SpO2 BMI  
 130/62 (BP 1 Location: Left arm) 64 5' 9\" (1.753 m) 186 lb 12.8 oz (84.7 kg) 98% 27.59 kg/m2 Smoking Status Former Smoker Vitals History BMI and BSA Data Body Mass Index Body Surface Area  
 27.59 kg/m 2 2.03 m 2 Preferred Pharmacy Pharmacy Name Phone [address] [facility] [address] [phone] Your Updated Medication List  
  
   
This list is accurate as of: 5/11/17 10:13 AM.  Always use your most recent med list.  
  
  
  
  
 aspirin 81 mg tablet Take  by mouth daily. clopidogrel 75 mg Tab Commonly known as:  PLAVIX Take 1 Tab by mouth daily. enalapril 2.5 mg tablet Commonly known as:  Ollis Bough Take 1 Tab by mouth daily. nitroglycerin 0.4 mg SL tablet Commonly known as:  NITROSTAT  
1 Tab by SubLINGual route every five (5) minutes as needed. pantoprazole 40 mg tablet Commonly known as:  PROTONIX Take 40 mg by mouth daily (with breakfast). Patient takes with food, breakfast  
  
 simvastatin 20 mg tablet Commonly known as:  ZOCOR Take 1 Tab by mouth nightly. We Performed the Following HEPATIC FUNCTION PANEL [48241 CPT(R)] LIPID PANEL [82992 CPT(R)] Follow-up Instructions Return in about 6 months (around 11/11/2017). Introducing [facility] SERVICES! Dear Talon Cervantes: Thank you for requesting a Jivox account. Our records indicate that you already have an active Jivox account. You can access your account anytime at [email or web address] Did you know that you can access your hospital and ER discharge instructions at any time in Jivox? You can also review all of your test results from your hospital stay or ER visit. Additional Information If you have questions, please visit the Frequently Asked Questions section of the Jivox website at [email or web address] Remember, Jivox is NOT to be used for urgent needs. For medical emergencies, dial 911. Now available from your iPhone and Android! Please provide this summary of care documentation to your next provider. Your primary care clinician is listed as Rachael Vides II. If you have any questions after today's visit, please call [phone].

## 2017-06-28 ENCOUNTER — HOSPITAL ENCOUNTER (OUTPATIENT)
Dept: LAB | Age: 67
Discharge: HOME | End: 2017-06-28
Payer: MEDICARE

## 2017-06-28 PROCEDURE — 80076 HEPATIC FUNCTION PANEL: CPT

## 2017-06-28 PROCEDURE — 36415 COLL VENOUS BLD VENIPUNCTURE: CPT

## 2017-06-28 PROCEDURE — 80061 LIPID PANEL: CPT

## 2017-06-29 LAB
ALBUMIN SERPL-MCNC: 4.7 G/DL (ref 3.6–4.8)
ALP SERPL-CCNC: 85 IU/L (ref 39–117)
ALT SERPL-CCNC: 30 IU/L (ref 0–44)
AST SERPL-CCNC: 25 IU/L (ref 0–40)
BILIRUB DIRECT SERPL-MCNC: 0.19 MG/DL (ref 0–0.4)
BILIRUB SERPL-MCNC: 0.9 MG/DL (ref 0–1.2)
CHOLEST SERPL-MCNC: 142 MG/DL (ref 100–199)
HDLC SERPL-MCNC: 37 MG/DL
INTERPRETATION, 910389: NORMAL
LDLC SERPL CALC-MCNC: 86 MG/DL (ref 0–99)
PROT SERPL-MCNC: 7.1 G/DL (ref 6–8.5)
TRIGL SERPL-MCNC: 96 MG/DL (ref 0–149)
VLDLC SERPL CALC-MCNC: 19 MG/DL (ref 5–40)

## 2017-07-19 DIAGNOSIS — E78.00 HYPERCHOLESTEREMIA: Primary | ICD-10-CM

## 2017-11-18 LAB
ALBUMIN SERPL-MCNC: 4.7 G/DL (ref 3.6–4.8)
ALP SERPL-CCNC: 84 IU/L (ref 39–117)
ALT SERPL-CCNC: 48 IU/L (ref 0–44)
AST SERPL-CCNC: 32 IU/L (ref 0–40)
BILIRUB DIRECT SERPL-MCNC: 0.17 MG/DL (ref 0–0.4)
BILIRUB SERPL-MCNC: 0.7 MG/DL (ref 0–1.2)
CHOLEST SERPL-MCNC: 136 MG/DL (ref 100–199)
HDLC SERPL-MCNC: 36 MG/DL
INTERPRETATION, 910389: NORMAL
LDLC SERPL CALC-MCNC: 84 MG/DL (ref 0–99)
PROT SERPL-MCNC: 7.1 G/DL (ref 6–8.5)
TRIGL SERPL-MCNC: 80 MG/DL (ref 0–149)
VLDLC SERPL CALC-MCNC: 16 MG/DL (ref 5–40)

## 2017-11-21 ENCOUNTER — OFFICE VISIT (OUTPATIENT)
Dept: CARDIOLOGY CLINIC | Age: 67
End: 2017-11-21

## 2017-11-21 VITALS
DIASTOLIC BLOOD PRESSURE: 70 MMHG | WEIGHT: 192.2 LBS | HEART RATE: 68 BPM | BODY MASS INDEX: 28.47 KG/M2 | OXYGEN SATURATION: 98 % | HEIGHT: 69 IN | SYSTOLIC BLOOD PRESSURE: 148 MMHG

## 2017-11-21 DIAGNOSIS — I25.10 ATHEROSCLEROSIS OF NATIVE CORONARY ARTERY OF NATIVE HEART WITHOUT ANGINA PECTORIS: Primary | ICD-10-CM

## 2017-11-21 NOTE — PROGRESS NOTES
LAST OFFICE VISIT : 5/11/2017        ICD-10-CM ICD-9-CM   1. Atherosclerosis of native coronary artery of native heart without angina pectoris I25.10 414.01            Olivia Ross is a 79 y.o. male with hypertension, dyslipidemia and CAD s/p stenting referred for 6 month follow up. Cardiac risk factors: dyslipidemia, male gender, hypertension  I have personally obtained the history from the patient. HISTORY OF PRESENTING ILLNESS      Overall the pt states he is doing well. The pt reports that he has been walking around the block regularly. He states he is not able to go to the gym with his wife as he is working all day. The patient denies chest pain/ shortness of breath, orthopnea, PND, LE edema, palpitations, syncope, presyncope or fatigue.          ACTIVE PROBLEM LIST     Patient Active Problem List    Diagnosis Date Noted    Prostate cancer ([facility] [address] 02/13/2017    Coronary atherosclerosis of native coronary artery 10/15/2010    Essential hypertension, benign 10/15/2010    Pure hypercholesterolemia 10/15/2010           PAST MEDICAL HISTORY     Past Medical History:   Diagnosis Date    CAD (coronary artery disease) 1999, 2009    hx of coronary stent- singe vessel- LAD    Cancer ([facility] [address]     prostate    Essential hypertension     GERD (gastroesophageal reflux disease)     Hyperlipidemia     Sleep apnea     CPAP complaint           PAST SURGICAL HISTORY     Past Surgical History:   Procedure Laterality Date    HX CATARACT REMOVAL Bilateral     HX HEART CATHETERIZATION  1999, & 12/21/2009    LAD    HX PTCA      Stent of the LAD in 1999 and again in 2009 with a 3 x 28 mm on 12/21/09 post dilated to 3.25    HX UROLOGICAL  10/2016    prostate biopsy          ALLERGIES     No Known Allergies       FAMILY HISTORY     Family History   Problem Relation Age of Onset    Hypertension Mother     Hypertension Father     Heart Disease Father 79     MI    Hypertension Sister     Hypertension Brother  Hypertension Brother     negative for cardiac disease       SOCIAL HISTORY     Social History     Social History    Marital status:      Spouse name: N/A    Number of children: N/A    Years of education: N/A     Social History Main Topics    Smoking status: Former Smoker     Packs/day: 1.00     Years: 30.00     Quit date: 1999    Smokeless tobacco: Never Used    Alcohol use No    Drug use: No      Comment: none since 1999- hx of casual use marijuana and cocaine    Sexual activity: Not Asked     Other Topics Concern    None     Social History Narrative         MEDICATIONS     Current Outpatient Prescriptions   Medication Sig    simvastatin (ZOCOR) 20 mg tablet Take 1 Tab by mouth nightly.  nitroglycerin (NITROSTAT) 0.4 mg SL tablet 1 Tab by SubLINGual route every five (5) minutes as needed.  enalapril (VASOTEC) 2.5 mg tablet Take 1 Tab by mouth daily.  clopidogrel (PLAVIX) 75 mg tab Take 1 Tab by mouth daily.  pantoprazole (PROTONIX) 40 mg tablet Take 40 mg by mouth daily (with breakfast). Patient takes with food, breakfast    aspirin 81 mg tablet Take  by mouth daily. No current facility-administered medications for this visit. I have reviewed the nurses notes, vitals, problem list, allergy list, medical history, family, social history and medications. REVIEW OF SYMPTOMS      General: Pt denies excessive weight gain or loss. Pt is able to conduct ADL's  HEENT: Denies blurred vision, headaches, hearing loss, epistaxis and difficulty swallowing. Respiratory: Denies cough, congestion, shortness of breath, DELONG, wheezing or stridor.   Cardiovascular: Denies precordial pain, palpitations, edema or PND  Gastrointestinal: Denies poor appetite, indigestion, abdominal pain or blood in stool  Genitourinary: Denies hematuria, dysuria, increased urinary frequency  Musculoskeletal: Denies joint pain or swelling from muscles or joints  Neurologic: Denies tremor, paresthesias, headache, or sensory motor disturbance  Psychiatric: Denies confusion, insomnia, depression  Integumentray: Denies rash, itching or ulcers. Hematologic: Denies easy bruising, bleeding     PHYSICAL EXAMINATION      Vitals:    11/21/17 1004   BP: 148/70   Pulse: 68   SpO2: 98%   Weight: 192 lb 3.2 oz (87.2 kg)   Height: 5' 9\" (1.753 m)     General: Well developed, in no acute distress. HEENT: No jaundice, oral mucosa moist, no oral ulcers  Neck: Supple, no stiffness, no lymphadenopathy, supple  Heart:  Normal S1/S2 negative S3 or S4. Regular, no murmur, gallop or rub, no jugular venous distention  Respiratory: Clear bilaterally x 4, no wheezing or rales  Extremities:  No edema, normal cap refill, no cyanosis. Musculoskeletal: No clubbing, no deformities  Neuro: A&Ox3, speech clear, gait stable, cooperative, no focal neurologic deficits  Skin: Skin color is normal. No rashes or lesions. Non diaphoretic, moist.        EKG: NSR     DIAGNOSTIC DATA     1. Echo  4/5/06 - EF 55%, RVSP 31 mmHg    2. Myocardial perfusion study  12/3/09 - Abnormal myocardial perfusion of the LAD territory with EF 55%    3. Cardiac catheterization  12/21/09 - Normal hemodynamics, Single vessel coronary artery disease of the LAD, EF 50% with regional wall motion abnormalities. LAD (99% in stent restenosis from 1999),RCA(insig),LCX(insig)    4.  Lipids  4/14/15 - , HDL 34, LDL 76, TG 96  10/20/16- , HDL 39, LDL 76, TG 79  11/17/17- , HDL 36, LDL 84, TG 80         LABORATORY DATA            Lab Results   Component Value Date/Time    WBC 11.3 02/14/2017 03:15 AM    HGB 13.7 02/14/2017 03:15 AM    HCT 41.8 02/14/2017 03:15 AM    PLATELET 196 48/66/9642 03:15 AM    MCV 87.6 02/14/2017 03:15 AM      Lab Results   Component Value Date/Time    Sodium 140 02/14/2017 03:15 AM    Potassium 4.1 02/14/2017 03:15 AM    Chloride 105 02/14/2017 03:15 AM    CO2 26 02/14/2017 03:15 AM    Anion gap 9 02/14/2017 03:15 AM    Glucose 130 02/14/2017 03:15 AM    BUN 12 02/14/2017 03:15 AM    Creatinine 1.48 02/14/2017 03:15 AM    BUN/Creatinine ratio 8 02/14/2017 03:15 AM    GFR est AA 58 02/14/2017 03:15 AM    GFR est non-AA 48 02/14/2017 03:15 AM    Calcium 8.1 02/14/2017 03:15 AM    Bilirubin, total 0.7 11/17/2017 08:47 AM    AST (SGOT) 32 11/17/2017 08:47 AM    Alk. phosphatase 84 11/17/2017 08:47 AM    Protein, total 7.1 11/17/2017 08:47 AM    Albumin 4.7 11/17/2017 08:47 AM    Globulin 3.2 02/06/2017 10:50 AM    A-G Ratio 1.3 02/06/2017 10:50 AM    ALT (SGPT) 48 11/17/2017 08:47 AM           ASSESSMENT/RECOMMENDATIONS:.      1. CAD s/p stenting  - he is asymptomatic at this time. I do not believe any further cardiac testing is needed. Continue risk factor modification. 2. Hypertension  - BP is well controlled in clinic today. I would not make any adjustments to his antihypertensives at this time. I counseled him to continue to exercise and eat a low sodium diet. 3. Dyslipidemia  - Lipids are at goal based on his last lipid panel on current regimen. I advised him to continue to exercise and eat a clean healthy diet. - His HDL is low and this may be related to inactivity, I again reinforced the importance of exercising in order to increase this. 4. Return in 6 months or PRN. Orders Placed This Encounter    AMB POC EKG ROUTINE W/ 12 LEADS, INTER & REP     Order Specific Question:   Reason for Exam:     Answer:   CAD        Follow-up Disposition: Not on File      I have discussed the diagnosis with  David Galeana and the intended plan as seen in the above orders. Questions were answered concerning future plans. I have discussed medication side effects and warnings with the patient as well. Thank you,  Cl Cordova MD for involving me in the care of  Davdi Galeana. Please do not hesitate to contact me for further questions/concerns.      Written by Natalee Goins, as dictated by Gurjit Vázquez MD.     Cristina Boyle SKYLAR Doan MD, [address]      [address] [facility], [address] [place] [address], [facility] [address]      [phone] / [phone] Fax

## 2017-11-21 NOTE — PROGRESS NOTES
Visit Vitals    /70 (BP 1 Location: Left arm)    Pulse 68    Ht 5' 9\" (1.753 m)    Wt 192 lb 3.2 oz (87.2 kg)    SpO2 98%    BMI 28.38 kg/m2

## 2017-11-21 NOTE — MR AVS SNAPSHOT
Visit Information Date & Time Provider Department Dept. Phone Encounter #  
 11/21/2017 10:20 AM Marie Srivastava MD [facility] [place] [phone] [id number] Upcoming Health Maintenance Date Due Hepatitis C Screening 1950 DTaP/Tdap/Td series (1 - Tdap) 4/6/1971 FOBT Q 1 YEAR AGE 50-75 4/6/2000 ZOSTER VACCINE AGE 60> 2/6/2010 GLAUCOMA SCREENING Q2Y 4/6/2015 Pneumococcal 65+ High/Highest Risk (1 of 2 - PCV13) 4/6/2015 MEDICARE YEARLY EXAM 4/6/2015 Influenza Age 5 to Adult 8/1/2017 Allergies as of 11/21/2017  Review Complete On: 11/21/2017 By: Mariama Moore No Known Allergies Current Immunizations  Never Reviewed No immunizations on file. Not reviewed this visit You Were Diagnosed With   
  
 Codes Comments Atherosclerosis of native coronary artery of native heart without angina pectoris    -  Primary ICD-10-CM: I25.10 ICD-9-CM: 414.01 Vitals BP Pulse Height(growth percentile) Weight(growth percentile) SpO2 BMI  
 148/70 (BP 1 Location: Left arm) 68 5' 9\" (1.753 m) 192 lb 3.2 oz (87.2 kg) 98% 28.38 kg/m2 Smoking Status Former Smoker Vitals History BMI and BSA Data Body Mass Index Body Surface Area  
 28.38 kg/m 2 2.06 m 2 Preferred Pharmacy Pharmacy Name Phone [address] [phone] Your Updated Medication List  
  
   
This list is accurate as of: 11/21/17 10:40 AM.  Always use your most recent med list.  
  
  
  
  
 aspirin 81 mg tablet Take  by mouth daily. clopidogrel 75 mg Tab Commonly known as:  PLAVIX Take 1 Tab by mouth daily. enalapril 2.5 mg tablet Commonly known as:  Piute Ratel Take 1 Tab by mouth daily. nitroglycerin 0.4 mg SL tablet Commonly known as:  NITROSTAT  
1 Tab by SubLINGual route every five (5) minutes as needed. pantoprazole 40 mg tablet Commonly known as:  PROTONIX Take 40 mg by mouth daily (with breakfast). Patient takes with food, breakfast  
  
 simvastatin 20 mg tablet Commonly known as:  ZOCOR Take 1 Tab by mouth nightly. We Performed the Following AMB POC EKG ROUTINE W/ 12 LEADS, INTER & REP [34592 CPT(R)] Introducing [facility] SERVICES! Dear Tim Negro: Thank you for requesting a Foodfly account. Our records indicate that you already have an active Foodfly account. You can access your account anytime at [email or web address] Did you know that you can access your hospital and ER discharge instructions at any time in Foodfly? You can also review all of your test results from your hospital stay or ER visit. Additional Information If you have questions, please visit the Frequently Asked Questions section of the Foodfly website at [email or web address] Remember, Foodfly is NOT to be used for urgent needs. For medical emergencies, dial 911. Now available from your iPhone and Android! Please provide this summary of care documentation to your next provider. Your primary care clinician is listed as Tegan Rodriguez Ii. If you have any questions after today's visit, please call [phone].

## 2018-04-20 RX ORDER — SIMVASTATIN 20 MG/1
20 TABLET, FILM COATED ORAL
Qty: 90 TAB | Refills: 0 | Status: SHIPPED | OUTPATIENT
Start: 2018-04-20 | End: 2019-10-21 | Stop reason: SDUPTHER

## 2018-04-20 RX ORDER — CLOPIDOGREL BISULFATE 75 MG/1
75 TABLET ORAL DAILY
Qty: 90 TAB | Refills: 0 | Status: SHIPPED | OUTPATIENT
Start: 2018-04-20 | End: 2020-01-21 | Stop reason: ALTCHOICE

## 2018-04-20 RX ORDER — ENALAPRIL MALEATE 2.5 MG/1
2.5 TABLET ORAL DAILY
Qty: 90 TAB | Refills: 0 | Status: SHIPPED | OUTPATIENT
Start: 2018-04-20 | End: 2019-10-21 | Stop reason: SDUPTHER

## 2018-04-20 NOTE — TELEPHONE ENCOUNTER
rx approved by Dr. Army Mcneil    Requested Prescriptions     Pending Prescriptions Disp Refills    simvastatin (ZOCOR) 20 mg tablet 90 Tab 0     Sig: Take 1 Tab by mouth nightly.  enalapril (VASOTEC) 2.5 mg tablet 90 Tab 0     Sig: Take 1 Tab by mouth daily.  clopidogrel (PLAVIX) 75 mg tab 90 Tab 0     Sig: Take 1 Tab by mouth daily.      Sent to [address]

## 2018-11-01 NOTE — PROGRESS NOTES
LAST OFFICE VISIT : 11/21/2017        ICD-10-CM ICD-9-CM   1. Essential hypertension, benign I10 401.1   2. Atherosclerosis of native coronary artery of native heart without angina pectoris I25.10 414.01   3. Pure hypercholesterolemia E78.00 272.0            Jony Mullen is a 76 y.o. male with HTN, dyslipidemia and CAD s/p stenting referred for follow up. Cardiac risk factors: dyslipidemia, male gender, hypertension  I have personally obtained the history from the patient. HISTORY OF PRESENTING ILLNESS     Overall the pt states he is doing well. He has gained some weight and he has not been eating very healthy. Pt does not exercise regularly. He had his last cath in 2009. The patient denies chest pain/ shortness of breath, orthopnea, PND, LE edema, palpitations, syncope, presyncope or fatigue.          ACTIVE PROBLEM LIST     Patient Active Problem List    Diagnosis Date Noted    Prostate cancer ([facility] [address] 02/13/2017    Coronary atherosclerosis of native coronary artery 10/15/2010    Essential hypertension, benign 10/15/2010    Pure hypercholesterolemia 10/15/2010           PAST MEDICAL HISTORY     Past Medical History:   Diagnosis Date    CAD (coronary artery disease) 1999, 2009    hx of coronary stent- singe vessel- LAD    Cancer ([facility] [address]     prostate    Essential hypertension     GERD (gastroesophageal reflux disease)     Hyperlipidemia     Sleep apnea     CPAP complaint           PAST SURGICAL HISTORY     Past Surgical History:   Procedure Laterality Date    HX CATARACT REMOVAL Bilateral     HX HEART CATHETERIZATION  1999, & 12/21/2009    LAD    HX PTCA      Stent of the LAD in 1999 and again in 2009 with a 3 x 28 mm on 12/21/09 post dilated to 3.25    HX UROLOGICAL  10/2016    prostate biopsy          ALLERGIES     No Known Allergies       FAMILY HISTORY     Family History   Problem Relation Age of Onset    Hypertension Mother     Hypertension Father     Heart Disease Father 79 MI    Hypertension Sister     Hypertension Brother     Hypertension Brother     negative for cardiac disease       SOCIAL HISTORY     Social History     Socioeconomic History    Marital status:      Spouse name: Not on file    Number of children: Not on file    Years of education: Not on file    Highest education level: Not on file   Social Needs    Financial resource strain: Not on file    Food insecurity - worry: Not on file    Food insecurity - inability: Not on file   [employer] needs - medical: Not on file   [email or web address] needs - non-medical: Not on file   Occupational History    Not on file   Tobacco Use    Smoking status: Former Smoker     Packs/day: 1.00     Years: 30.00     Pack years: 30.00     Last attempt to quit:      Years since quittin.8    Smokeless tobacco: Never Used   Substance and Sexual Activity    Alcohol use: No    Drug use: No     Comment: none since - hx of casual use marijuana and cocaine    Sexual activity: Not on file   Other Topics Concern    Not on file   Social History Narrative    Not on file         MEDICATIONS     Current Outpatient Medications   Medication Sig    simvastatin (ZOCOR) 20 mg tablet Take 1 Tab by mouth nightly.  enalapril (VASOTEC) 2.5 mg tablet Take 1 Tab by mouth daily.  clopidogrel (PLAVIX) 75 mg tab Take 1 Tab by mouth daily.  nitroglycerin (NITROSTAT) 0.4 mg SL tablet 1 Tab by SubLINGual route every five (5) minutes as needed.  pantoprazole (PROTONIX) 40 mg tablet Take 40 mg by mouth daily (with breakfast). Patient takes with food, breakfast    aspirin 81 mg tablet Take  by mouth daily. No current facility-administered medications for this visit. I have reviewed the nurses notes, vitals, problem list, allergy list, medical history, family, social history and medications. REVIEW OF SYMPTOMS      General: Pt denies excessive weight gain or loss.  Pt is able to conduct ADL's  HEENT: Denies blurred vision, headaches, hearing loss, epistaxis and difficulty swallowing. Respiratory: Denies cough, congestion, shortness of breath, DELONG, wheezing or stridor. Cardiovascular: Denies precordial pain, palpitations, edema or PND  Gastrointestinal: Denies poor appetite, indigestion, abdominal pain or blood in stool  Genitourinary: Denies hematuria, dysuria, increased urinary frequency  Musculoskeletal: Denies joint pain or swelling from muscles or joints  Neurologic: Denies tremor, paresthesias, headache, or sensory motor disturbance  Psychiatric: Denies confusion, insomnia, depression  Integumentray: Denies rash, itching or ulcers. Hematologic: Denies easy bruising, bleeding     PHYSICAL EXAMINATION      Vitals:    11/06/18 1516   BP: 126/68   Pulse: 64   Resp: 16   Weight: 191 lb (86.6 kg)   Height: 5' 9\" (1.753 m)     General: Well developed, in no acute distress. HEENT: No jaundice, oral mucosa moist, no oral ulcers  Neck: Supple, no stiffness, no lymphadenopathy, supple  Heart:  RRR  Respiratory: Clear bilaterally x 4, no wheezing or rales    Extremities:  No edema, normal cap refill, no cyanosis. Musculoskeletal: No clubbing, no deformities  Neuro: A&Ox3, speech clear, gait stable, cooperative, no focal neurologic deficits  Skin: Skin color is normal. No rashes or lesions. Non diaphoretic, moist.       DIAGNOSTIC DATA     1. Echo  4/5/06 - EF 55%, RVSP 31 mmHg    2. Myocardial perfusion study  12/3/09 - Abnormal myocardial perfusion of the LAD territory with EF 55%  (2/6/18)(stress)(7.0 METS)(5:01) LVEF 66%  (Normal study)  (2/6/18) exercise Cardiolite that was normal with EF 66%. 3. Cardiac catheterization  12/21/09 - Normal hemodynamics, Single vessel coronary artery disease of the LAD, EF 50% with regional wall motion abnormalities. LAD (99% in stent restenosis from 1999),RCA(insig),LCX(insig)    4.  Lipids  4/14/15 - , HDL 34, LDL 76, TG 96  10/20/16- , HDL 39, LDL 76, TG 79  11/17/17- , HDL 36, LDL 84, TG 80         LABORATORY DATA            Lab Results   Component Value Date/Time    WBC 11.3 (H) 02/14/2017 03:15 AM    HGB 13.7 02/14/2017 03:15 AM    HCT 41.8 02/14/2017 03:15 AM    PLATELET 526 43/67/5328 03:15 AM    MCV 87.6 02/14/2017 03:15 AM      Lab Results   Component Value Date/Time    Sodium 140 02/14/2017 03:15 AM    Potassium 4.1 02/14/2017 03:15 AM    Chloride 105 02/14/2017 03:15 AM    CO2 26 02/14/2017 03:15 AM    Anion gap 9 02/14/2017 03:15 AM    Glucose 130 (H) 02/14/2017 03:15 AM    BUN 12 02/14/2017 03:15 AM    Creatinine 1.48 (H) 02/14/2017 03:15 AM    BUN/Creatinine ratio 8 (L) 02/14/2017 03:15 AM    GFR est AA 58 (L) 02/14/2017 03:15 AM    GFR est non-AA 48 (L) 02/14/2017 03:15 AM    Calcium 8.1 (L) 02/14/2017 03:15 AM    Bilirubin, total 0.7 11/17/2017 08:47 AM    AST (SGOT) 32 11/17/2017 08:47 AM    Alk. phosphatase 84 11/17/2017 08:47 AM    Protein, total 7.1 11/17/2017 08:47 AM    Albumin 4.7 11/17/2017 08:47 AM    Globulin 3.2 02/06/2017 10:50 AM    A-G Ratio 1.3 02/06/2017 10:50 AM    ALT (SGPT) 48 (H) 11/17/2017 08:47 AM           ASSESSMENT/RECOMMENDATIONS:.      1. CAD s/p stenting  - he is asymptomatic at this time. I do not believe any further cardiac testing is needed. Continue risk factor modification. I think he's not eating clean/healthy food and I reviewed with him and his wife about the importance of doing so as well as [address]. 2. HTN  - Blood pressure is under good control, no adjustments in antihypertensives. 3. Dyslipidemia  - Lipids are at goal on current medical regimen. Will give him another lab slip to check on his cholesterol. 4. Return in 6 months or PRN.     Orders Placed This Encounter    HEPATIC FUNCTION PANEL    LIPID PANEL    AMB POC EKG ROUTINE W/ 12 LEADS, INTER & REP     Order Specific Question:   Reason for Exam:     Answer:   routine        Follow-up Disposition: Not on File      I have discussed the diagnosis with  Jony Mullen and the intended plan as seen in the above orders. Questions were answered concerning future plans. I have discussed medication side effects and warnings with the patient as well. Thank you,  Christiana Friedman MD for involving me in the care of  Jony Mullen. Please do not hesitate to contact me for further questions/concerns. Written by Leeann Nieves, as dictated by Quincy Daily MD.     Sharon Gilomonangela Hardwick MD, [facility]    Patient Care Team:  Christiana Friedman MD as PCP - [facility])  Mike Rendon MD as Physician (Cardiology)    [address], [address] [place] [address]     [facility]      [phone] / [phone] Fax

## 2018-11-06 ENCOUNTER — OFFICE VISIT (OUTPATIENT)
Dept: CARDIOLOGY CLINIC | Age: 68
End: 2018-11-06

## 2018-11-06 VITALS
RESPIRATION RATE: 16 BRPM | SYSTOLIC BLOOD PRESSURE: 126 MMHG | DIASTOLIC BLOOD PRESSURE: 68 MMHG | BODY MASS INDEX: 28.29 KG/M2 | WEIGHT: 191 LBS | HEIGHT: 69 IN | HEART RATE: 64 BPM

## 2018-11-06 DIAGNOSIS — I10 ESSENTIAL HYPERTENSION, BENIGN: Primary | ICD-10-CM

## 2018-11-06 DIAGNOSIS — E78.00 PURE HYPERCHOLESTEROLEMIA: ICD-10-CM

## 2018-11-06 DIAGNOSIS — I25.10 ATHEROSCLEROSIS OF NATIVE CORONARY ARTERY OF NATIVE HEART WITHOUT ANGINA PECTORIS: ICD-10-CM

## 2018-11-06 NOTE — PROGRESS NOTES
Chief Complaint   Patient presents with    Cholesterol Problem    Other     Athereosclerosis    Hypertension     Visit Vitals  /68 (BP 1 Location: Right arm, BP Patient Position: Sitting)   Pulse 64   Resp 16   Ht 5' 9\" (1.753 m)   Wt 191 lb (86.6 kg)   BMI 28.21 kg/m²

## 2019-01-18 ENCOUNTER — HOSPITAL ENCOUNTER (OUTPATIENT)
Dept: LAB | Age: 69
Discharge: HOME | End: 2019-01-18
Payer: MEDICARE

## 2019-01-18 PROCEDURE — 80061 LIPID PANEL: CPT

## 2019-01-18 PROCEDURE — 36415 COLL VENOUS BLD VENIPUNCTURE: CPT

## 2019-01-18 PROCEDURE — 80076 HEPATIC FUNCTION PANEL: CPT

## 2019-01-19 LAB
ALBUMIN SERPL-MCNC: 4.7 G/DL (ref 3.6–4.8)
ALP SERPL-CCNC: 73 IU/L (ref 39–117)
ALT SERPL-CCNC: 24 IU/L (ref 0–44)
AST SERPL-CCNC: 20 IU/L (ref 0–40)
BILIRUB DIRECT SERPL-MCNC: 0.15 MG/DL (ref 0–0.4)
BILIRUB SERPL-MCNC: 0.6 MG/DL (ref 0–1.2)
CHOLEST SERPL-MCNC: 115 MG/DL (ref 100–199)
HDLC SERPL-MCNC: 34 MG/DL
INTERPRETATION, 910389: NORMAL
LDLC SERPL CALC-MCNC: 67 MG/DL (ref 0–99)
PROT SERPL-MCNC: 7 G/DL (ref 6–8.5)
TRIGL SERPL-MCNC: 71 MG/DL (ref 0–149)
VLDLC SERPL CALC-MCNC: 14 MG/DL (ref 5–40)

## 2019-06-13 RX ORDER — NITROGLYCERIN 0.4 MG/1
0.4 TABLET SUBLINGUAL
Qty: 25 TAB | Refills: 3 | Status: SHIPPED | OUTPATIENT
Start: 2019-06-13 | End: 2021-07-13

## 2019-07-09 ENCOUNTER — OFFICE VISIT (OUTPATIENT)
Dept: CARDIOLOGY CLINIC | Age: 69
End: 2019-07-09

## 2019-07-09 VITALS
RESPIRATION RATE: 16 BRPM | DIASTOLIC BLOOD PRESSURE: 56 MMHG | HEIGHT: 69 IN | SYSTOLIC BLOOD PRESSURE: 102 MMHG | WEIGHT: 194.2 LBS | HEART RATE: 64 BPM | BODY MASS INDEX: 28.76 KG/M2

## 2019-07-09 DIAGNOSIS — I25.10 ATHEROSCLEROSIS OF NATIVE CORONARY ARTERY OF NATIVE HEART WITHOUT ANGINA PECTORIS: Primary | ICD-10-CM

## 2019-07-09 DIAGNOSIS — I10 ESSENTIAL HYPERTENSION, BENIGN: ICD-10-CM

## 2019-07-09 NOTE — PROGRESS NOTES
LAST OFFICE VISIT : Visit date not found      No diagnosis foundSusy Maradiaga is a 71 y.o. male with HTN, dyslipidemia, and CAD s/p stenting referred for follow up. Cardiac risk factors: dyslipidemia, male gender, hypertension  I have personally obtained the history from the patient. HISTORY OF PRESENTING ILLNESS     Overall the pt states he is doing well. Pt is still taking the Plavix. Pt has not needed to use nitroglycerin. The patient denies chest pain/ shortness of breath, orthopnea, PND, LE edema, palpitations, syncope, presyncope or fatigue.          ACTIVE PROBLEM LIST     Patient Active Problem List    Diagnosis Date Noted    Prostate cancer ([facility] [address] 02/13/2017    Coronary atherosclerosis of native coronary artery 10/15/2010    Essential hypertension, benign 10/15/2010    Pure hypercholesterolemia 10/15/2010           PAST MEDICAL HISTORY     Past Medical History:   Diagnosis Date    CAD (coronary artery disease) 1999, 2009    hx of coronary stent- singe vessel- LAD    Cancer ([facility] [address]     prostate    Essential hypertension     GERD (gastroesophageal reflux disease)     Hyperlipidemia     Sleep apnea     CPAP complaint           PAST SURGICAL HISTORY     Past Surgical History:   Procedure Laterality Date    HX CATARACT REMOVAL Bilateral     HX HEART CATHETERIZATION  1999, & 12/21/2009    LAD    HX PTCA      Stent of the LAD in 1999 and again in 2009 with a 3 x 28 mm on 12/21/09 post dilated to 3.25    HX UROLOGICAL  10/2016    prostate biopsy          ALLERGIES     No Known Allergies       FAMILY HISTORY     Family History   Problem Relation Age of Onset    Hypertension Mother     Hypertension Father     Heart Disease Father 79        MI    Hypertension Sister     Hypertension Brother     Hypertension Brother     negative for cardiac disease       SOCIAL HISTORY     Social History     Socioeconomic History    Marital status:      Spouse name: Not on file    Number of children: Not on file    Years of education: Not on file    Highest education level: Not on file   Tobacco Use    Smoking status: Former Smoker     Packs/day: 1.00     Years: 30.00     Pack years: 30.00     Last attempt to quit: [address]     Years since quittin.5    Smokeless tobacco: Never Used   Substance and Sexual Activity    Alcohol use: No    Drug use: No     Comment: none since - hx of casual use marijuana and cocaine         MEDICATIONS     Current Outpatient Medications   Medication Sig    nitroglycerin (NITROSTAT) 0.4 mg SL tablet 1 Tab by SubLINGual route every five (5) minutes as needed for Chest Pain.  simvastatin (ZOCOR) 20 mg tablet Take 1 Tab by mouth nightly.  enalapril (VASOTEC) 2.5 mg tablet Take 1 Tab by mouth daily.  clopidogrel (PLAVIX) 75 mg tab Take 1 Tab by mouth daily.  pantoprazole (PROTONIX) 40 mg tablet Take 40 mg by mouth daily (with breakfast). Patient takes with food, breakfast    aspirin 81 mg tablet Take  by mouth daily. No current facility-administered medications for this visit. I have reviewed the nurses notes, vitals, problem list, allergy list, medical history, family, social history and medications. REVIEW OF SYMPTOMS      General: Pt denies excessive weight gain or loss. Pt is able to conduct ADL's  HEENT: Denies blurred vision, headaches, hearing loss, epistaxis and difficulty swallowing. Respiratory: Denies cough, congestion, shortness of breath, DELONG, wheezing or stridor.   Cardiovascular: Denies precordial pain, palpitations, edema or PND  Gastrointestinal: Denies poor appetite, indigestion, abdominal pain or blood in stool  Genitourinary: Denies hematuria, dysuria, increased urinary frequency  Musculoskeletal: Denies joint pain or swelling from muscles or joints  Neurologic: Denies tremor, paresthesias, headache, or sensory motor disturbance  Psychiatric: Denies confusion, insomnia, depression  Integumentray: Denies rash, itching or ulcers. Hematologic: Denies easy bruising, bleeding     PHYSICAL EXAMINATION      Vitals:    07/09/19 1358   BP: 102/56   Pulse: 64   Resp: 16   Weight: 194 lb 3.2 oz (88.1 kg)   Height: 5' 9\" (1.753 m)     General: Well developed, in no acute distress. HEENT: No jaundice, oral mucosa moist, no oral ulcers  Neck: Supple, no stiffness, no lymphadenopathy, supple  Heart:  Normal S1/S2 negative S3 or S4. Regular, no murmur, gallop or rub, no jugular venous distention  Respiratory: Clear bilaterally x 4, no wheezing or rales  Abdomen:   Soft, non-tender, bowel sounds are active. Extremities:  No edema, normal cap refill, no cyanosis. Musculoskeletal: No clubbing, no deformities  Neuro: A&Ox3, speech clear, gait stable, cooperative, no focal neurologic deficits  Skin: Skin color is normal. No rashes or lesions. Non diaphoretic, moist.  Vascular: 2+ pulses symmetric in all extremities           DIAGNOSTIC DATA     1. Echo  4/5/06 - EF 55%, RVSP 31 mmHg    2. Myocardial perfusion study  12/3/09 - Abnormal myocardial perfusion of the LAD territory with EF 55%  (2/6/18)(stress)(7.0 METS)(5:01) LVEF 66%  (Normal study)  (2/6/18) exercise Cardiolite that was normal with EF 66%. 3. Cardiac catheterization  12/21/09 - Normal hemodynamics, Single vessel coronary artery disease of the LAD, EF 50% with regional wall motion abnormalities. LAD (99% in stent restenosis from 1999),RCA(insig),LCX(insig)    4.  Lipids  4/14/15 - , HDL 34, LDL 76, TG 96  10/20/16- , HDL 39, LDL 76, TG 79  11/17/17- , HDL 36, LDL 84, TG 80  1/18/19- , HDL 34, LDL 67, TG 71  5/15/19- , HDL 32, LDL 67,          LABORATORY DATA            Lab Results   Component Value Date/Time    WBC 11.3 (H) 02/14/2017 03:15 AM    HGB 13.7 02/14/2017 03:15 AM    HCT 41.8 02/14/2017 03:15 AM    PLATELET 319 07/81/4084 03:15 AM    MCV 87.6 02/14/2017 03:15 AM      Lab Results   Component Value Date/Time    Sodium 140 02/14/2017 03:15 AM    Potassium 4.1 02/14/2017 03:15 AM    Chloride 105 02/14/2017 03:15 AM    CO2 26 02/14/2017 03:15 AM    Anion gap 9 02/14/2017 03:15 AM    Glucose 130 (H) 02/14/2017 03:15 AM    BUN 12 02/14/2017 03:15 AM    Creatinine 1.48 (H) 02/14/2017 03:15 AM    BUN/Creatinine ratio 8 (L) 02/14/2017 03:15 AM    GFR est AA 58 (L) 02/14/2017 03:15 AM    GFR est non-AA 48 (L) 02/14/2017 03:15 AM    Calcium 8.1 (L) 02/14/2017 03:15 AM    Bilirubin, total 0.6 01/18/2019 10:52 AM    AST (SGOT) 20 01/18/2019 10:52 AM    Alk. phosphatase 73 01/18/2019 10:52 AM    Protein, total 7.0 01/18/2019 10:52 AM    Albumin 4.7 01/18/2019 10:52 AM    Globulin 3.2 02/06/2017 10:50 AM    A-G Ratio 1.3 02/06/2017 10:50 AM    ALT (SGPT) 24 01/18/2019 10:52 AM           ASSESSMENT/RECOMMENDATIONS:.      1. CAD s/p stenting  - He is asymptomatic at this time. I do not believe any further cardiac testing is needed. Continue risk factor modification.   - His last stent was in 2009 he has been on Plavix all this time. I think he can come off the Plavix and double his ASA for two weeks and then go back to one ASA a day. 2. HTN  - Blood pressure is under good control, no adjustments in antihypertensives. 3. Dyslipidemia  - Lipids are at goal on current medical regimen.    - Last HDL was low at 34. Counseled on exercising. 4. Return in 6 months or PRN. No orders of the defined types were placed in this encounter. I have discussed the diagnosis with  Romel See and the intended plan as seen in the above orders. Questions were answered concerning future plans. I have discussed medication side effects and warnings with the patient as well. Thank you,  Abraham Harris MD for involving me in the care of  Romel See. Please do not hesitate to contact me for further questions/concerns. Written by Jolynn Tapia, as dictated by Murphy Opitz, MD.     Tha Greenberg MD, [facility] [place]    Patient Care Team:  Mert Rodarte MD as PCP - [facility])  Heri Marie MD as Physician (Cardiology)    [address], [address] [place] [address] Drive      [phone] / [phone] Fax

## 2019-07-09 NOTE — LETTER
7/9/19 Patient: Eneida Obrien YOB: 1950 Date of Visit: 7/9/2019 Leo [address] [facility] [address] VIA Facsimile: [phone] Dear Aguila Snyder MD, Thank you for referring Mr. Aranza Larkin to [address]  for evaluation. My notes for this consultation are attached. If you have questions, please do not hesitate to call me. I look forward to following your patient along with you.  
 
 
Sincerely, 
 
Barry Campbell MD

## 2019-07-09 NOTE — PROGRESS NOTES
Visit Vitals  /56 (BP 1 Location: Left arm)   Pulse 64   Resp 16   Ht 5' 9\" (1.753 m)   Wt 194 lb 3.2 oz (88.1 kg)   BMI 28.68 kg/m²       Patient is here for follow up. Denies chest pain, SOB or swelling.

## 2019-10-22 RX ORDER — SIMVASTATIN 20 MG/1
20 TABLET, FILM COATED ORAL
Qty: 90 TAB | Refills: 1 | Status: SHIPPED | OUTPATIENT
Start: 2019-10-22 | End: 2020-04-10

## 2019-10-22 RX ORDER — ENALAPRIL MALEATE 2.5 MG/1
2.5 TABLET ORAL DAILY
Qty: 90 TAB | Refills: 1 | Status: SHIPPED | OUTPATIENT
Start: 2019-10-22 | End: 2020-04-10

## 2020-01-21 ENCOUNTER — OFFICE VISIT (OUTPATIENT)
Dept: CARDIOLOGY CLINIC | Age: 70
End: 2020-01-21

## 2020-01-21 VITALS
BODY MASS INDEX: 28.58 KG/M2 | OXYGEN SATURATION: 97 % | HEART RATE: 76 BPM | WEIGHT: 193 LBS | DIASTOLIC BLOOD PRESSURE: 70 MMHG | HEIGHT: 69 IN | RESPIRATION RATE: 18 BRPM | SYSTOLIC BLOOD PRESSURE: 130 MMHG

## 2020-01-21 DIAGNOSIS — E78.00 PURE HYPERCHOLESTEROLEMIA: ICD-10-CM

## 2020-01-21 DIAGNOSIS — I25.10 ATHEROSCLEROSIS OF NATIVE CORONARY ARTERY OF NATIVE HEART WITHOUT ANGINA PECTORIS: Primary | ICD-10-CM

## 2020-01-21 DIAGNOSIS — I25.10 CORONARY ATHEROSCLEROSIS OF NATIVE CORONARY ARTERY: ICD-10-CM

## 2020-01-21 DIAGNOSIS — I10 ESSENTIAL HYPERTENSION, BENIGN: ICD-10-CM

## 2020-01-21 RX ORDER — ACETAMINOPHEN 500 MG
TABLET ORAL
COMMUNITY
End: 2022-04-19

## 2020-01-21 RX ORDER — METHOCARBAMOL 500 MG/1
TABLET, FILM COATED ORAL
COMMUNITY
Start: 2020-01-12 | End: 2020-07-20 | Stop reason: ALTCHOICE

## 2020-01-21 RX ORDER — PANTOPRAZOLE SODIUM 40 MG/1
40 TABLET, DELAYED RELEASE ORAL
Qty: 1 TAB | Status: CANCELLED | OUTPATIENT
Start: 2020-01-21

## 2020-01-21 NOTE — PROGRESS NOTES
All health maintenance and other pertinent information has been reviewed in preparation for today's office visit. Patient presents in the office today for:    Chief Complaint   Patient presents with    Follow-up     6 MO FLU: Atherosclerosis of native coronary artery of native heart without angina pectoris, HTN     1. Have you been to the ER, urgent care clinic since your last visit? Hospitalized since your last visit? Yes. [facility] on or about 1/14/20 for back pain. 2. Have you seen or consulted any other health care providers outside of the [address] [place] [address] since your last visit? Include any pap smears or colon screening.  No      Visit Vitals  /70 (BP 1 Location: Left arm, BP Patient Position: Sitting)   Pulse 76   Resp 18   Ht 5' 9\" (1.753 m)   Wt 193 lb (87.5 kg)   SpO2 97%   BMI 28.50 kg/m²

## 2020-01-21 NOTE — PROGRESS NOTES
LAST OFFICE VISIT : Visit date not found        ICD-10-CM ICD-9-CM   1. Atherosclerosis of native coronary artery of native heart without angina pectoris I25.10 414.01   2. Essential hypertension, benign I10 401.1   3. Pure hypercholesterolemia E78.00 272.0   4. Coronary atherosclerosis of native coronary artery I25.10 414.01            Godfrey Farrell is a 71 y.o. male with HTN, dyslipidemia, and CAD s/p stenting referred for follow up. Cardiac risk factors: dyslipidemia, male gender, hypertension  I have personally obtained the history from the patient. HISTORY OF PRESENTING ILLNESS     Overall the pt states he is doing well. Pt states that about 2 months ago he experienced tachycardia, but he only had a couple instances of it. Pt is not exercising regularly. The patient denies chest pain/ shortness of breath, orthopnea, PND, LE edema, syncope, presyncope or fatigue.          ACTIVE PROBLEM LIST     Patient Active Problem List    Diagnosis Date Noted    Prostate cancer ([facility] [address] 02/13/2017    Coronary atherosclerosis of native coronary artery 10/15/2010    Essential hypertension, benign 10/15/2010    Pure hypercholesterolemia 10/15/2010           PAST MEDICAL HISTORY     Past Medical History:   Diagnosis Date    CAD (coronary artery disease) 1999, 2009    hx of coronary stent- singe vessel- LAD    Cancer ([facility] [address]     prostate    Essential hypertension     GERD (gastroesophageal reflux disease)     Hyperlipidemia     Sleep apnea     CPAP complaint           PAST SURGICAL HISTORY     Past Surgical History:   Procedure Laterality Date    HX CATARACT REMOVAL Bilateral     HX HEART CATHETERIZATION  1999, & 12/21/2009    LAD    HX PTCA      Stent of the LAD in 1999 and again in 2009 with a 3 x 28 mm on 12/21/09 post dilated to 3.25    HX UROLOGICAL  10/2016    prostate biopsy          ALLERGIES     No Known Allergies       FAMILY HISTORY     Family History   Problem Relation Age of Onset    Hypertension Mother     Hypertension Father     Heart Disease Father 79        MI    Hypertension Sister     Hypertension Brother     Hypertension Brother     negative for cardiac disease       SOCIAL HISTORY     Social History     Socioeconomic History    Marital status:      Spouse name: Not on file    Number of children: Not on file    Years of education: Not on file    Highest education level: Not on file   Tobacco Use    Smoking status: Former Smoker     Packs/day: 1.00     Years: 30.00     Pack years: 30.00     Last attempt to quit:      Years since quittin.0    Smokeless tobacco: Never Used   Substance and Sexual Activity    Alcohol use: No    Drug use: No     Comment: none since - hx of casual use marijuana and cocaine    Sexual activity: Not Currently         MEDICATIONS     Current Outpatient Medications   Medication Sig    methocarbamol (ROBAXIN) 500 mg tablet TAKE 2 TABLETS BY MOUTH 4 TIMES DAILY AS NEEDED FOR MUSCLE SPASM    acetaminophen (TYLENOL EXTRA STRENGTH) 500 mg tablet Take  by mouth every six (6) hours as needed for Pain.  simvastatin (ZOCOR) 20 mg tablet Take 1 Tab by mouth nightly.  enalapril (VASOTEC) 2.5 mg tablet Take 1 Tab by mouth daily.  pantoprazole (PROTONIX) 40 mg tablet Take 40 mg by mouth daily (with breakfast). Patient takes with food, breakfast    aspirin 81 mg tablet Take  by mouth daily.  nitroglycerin (NITROSTAT) 0.4 mg SL tablet 1 Tab by SubLINGual route every five (5) minutes as needed for Chest Pain. No current facility-administered medications for this visit. I have reviewed the nurses notes, vitals, problem list, allergy list, medical history, family, social history and medications. REVIEW OF SYMPTOMS      General: Pt denies excessive weight gain or loss. Pt is able to conduct ADL's  HEENT: Denies blurred vision, headaches, hearing loss, epistaxis and difficulty swallowing.   Respiratory: Denies cough, congestion, shortness of breath, DELONG, wheezing or stridor. Cardiovascular: Denies precordial pain, edema or PND positive for palpitations  Gastrointestinal: Denies poor appetite, indigestion, abdominal pain or blood in stool  Genitourinary: Denies hematuria, dysuria, increased urinary frequency  Musculoskeletal: Denies joint pain or swelling from muscles or joints  Neurologic: Denies tremor, paresthesias, headache, or sensory motor disturbance  Psychiatric: Denies confusion, insomnia, depression  Integumentray: Denies rash, itching or ulcers. Hematologic: Denies easy bruising, bleeding     PHYSICAL EXAMINATION      Vitals:    01/21/20 1429   BP: 130/70   Pulse: 76   Resp: 18   SpO2: 97%   Weight: 193 lb (87.5 kg)   Height: 5' 9\" (1.753 m)     General: Well developed, in no acute distress. HEENT: No jaundice, oral mucosa moist, no oral ulcers  Neck: Supple, no stiffness, no lymphadenopathy, supple  Heart:  Normal S1/S2 negative S3 or S4. Regular, no murmur, gallop or rub, no jugular venous distention  Respiratory: Clear bilaterally x 4, no wheezing or rales  Abdomen:   Soft, non-tender, bowel sounds are active. Extremities:  No edema, normal cap refill, no cyanosis. Musculoskeletal: No clubbing, no deformities  Neuro: A&Ox3, speech clear, gait stable, cooperative, no focal neurologic deficits  Skin: Skin color is normal. No rashes or lesions. Non diaphoretic, moist.  Vascular: 2+ pulses symmetric in all extremities        EKG: NSR     DIAGNOSTIC DATA     1. Echo  4/5/06 - EF 55%, RVSP 31 mmHg    2. Myocardial perfusion study  12/3/09 - Abnormal myocardial perfusion of the LAD territory with EF 55%  (2/6/18)(stress)(7.0 METS)(5:01) LVEF 66%  (Normal study)  (2/6/18) exercise Cardiolite that was normal with EF 66%. 3. Cardiac catheterization  12/21/09 - Normal hemodynamics, Single vessel coronary artery disease of the LAD, EF 50% with regional wall motion abnormalities.  LAD (99% in stent restenosis from 1999),RCA(insig),LCX(insig)    4. Lipids  4/14/15 - , HDL 34, LDL 76, TG 96  10/20/16- , HDL 39, LDL 76, TG 79  11/17/17- , HDL 36, LDL 84, TG 80  1/18/19- , HDL 34, LDL 67, TG 71  5/15/19- , HDL 32, LDL 67,          LABORATORY DATA            Lab Results   Component Value Date/Time    WBC 11.3 (H) 02/14/2017 03:15 AM    HGB 13.7 02/14/2017 03:15 AM    HCT 41.8 02/14/2017 03:15 AM    PLATELET 860 92/98/7951 03:15 AM    MCV 87.6 02/14/2017 03:15 AM      Lab Results   Component Value Date/Time    Sodium 140 02/14/2017 03:15 AM    Potassium 4.1 02/14/2017 03:15 AM    Chloride 105 02/14/2017 03:15 AM    CO2 26 02/14/2017 03:15 AM    Anion gap 9 02/14/2017 03:15 AM    Glucose 130 (H) 02/14/2017 03:15 AM    BUN 12 02/14/2017 03:15 AM    Creatinine 1.48 (H) 02/14/2017 03:15 AM    BUN/Creatinine ratio 8 (L) 02/14/2017 03:15 AM    GFR est AA 58 (L) 02/14/2017 03:15 AM    GFR est non-AA 48 (L) 02/14/2017 03:15 AM    Calcium 8.1 (L) 02/14/2017 03:15 AM    Bilirubin, total 0.6 01/18/2019 10:52 AM    AST (SGOT) 20 01/18/2019 10:52 AM    Alk. phosphatase 73 01/18/2019 10:52 AM    Protein, total 7.0 01/18/2019 10:52 AM    Albumin 4.7 01/18/2019 10:52 AM    Globulin 3.2 02/06/2017 10:50 AM    A-G Ratio 1.3 02/06/2017 10:50 AM    ALT (SGPT) 24 01/18/2019 10:52 AM           ASSESSMENT/RECOMMENDATIONS:.      1. CAD s/p stenting  - He has not had any further chest discomfort. Continue risk factor modification. Will order an echo to look at LV function when he returns in 6 months. Last echo was checked in 2006. 2. HTN  - Blood pressure is under good control, no adjustments in antihypertensives. 3. Dyslipidemia  - Lipids are at goal on current medical regimen. I will be following his cholesterol. Will give him a lab slip for his cholesterol to be rechecked in 6 months. - Last HDL was low at 32. Counseled on exercise. 4. Return in 6 months or PRN.      Orders Placed This Encounter    LIPID PANEL     Standing Status:   Future     Standing Expiration Date:   1/21/2021    HEPATIC FUNCTION PANEL     Standing Status:   Future     Standing Expiration Date:   1/21/2021    AMB POC EKG ROUTINE W/ 12 LEADS, INTER & REP     Order Specific Question:   Reason for Exam:     Answer:   6 mo f/u    methocarbamol (ROBAXIN) 500 mg tablet     Sig: TAKE 2 TABLETS BY MOUTH 4 TIMES DAILY AS NEEDED FOR MUSCLE SPASM    acetaminophen (TYLENOL EXTRA STRENGTH) 500 mg tablet     Sig: Take  by mouth every six (6) hours as needed for Pain. Follow-up and Dispositions  ·   Return in about 6 months (around 7/21/2020). I have discussed the diagnosis with  Barak Thompson and the intended plan as seen in the above orders. Questions were answered concerning future plans. I have discussed medication side effects and warnings with the patient as well. Thank you,  Elise Parkinson MD for involving me in the care of  Barak Thompson. Please do not hesitate to contact me for further questions/concerns. Written by Brent Wayne, as dictated by Jazmyn George MD.     Serene Dozier MD, [facility] [place]    Patient Care Team:  Elise Parkinson MD as PCP - [facility])  Deshaun Willis MD as Physician (Cardiology)    [address], [address] [place] [address]. Mason Blevins.      [phone] / [phone] Fax

## 2020-03-25 ENCOUNTER — TELEPHONE (OUTPATIENT)
Dept: CARDIOLOGY CLINIC | Age: 70
End: 2020-03-25

## 2020-03-25 NOTE — TELEPHONE ENCOUNTER
Pt states he had chest pain over the weekend and used NTG and then antiacid which did help. CP came back today and he used NTG then antiacid - went away within the hour. He states CP is similar to previous heart event. He is concerned about coming in but wanted to know what you wanted him to do. Asked him to go to ER if CP comes back and will not go away.

## 2020-03-25 NOTE — TELEPHONE ENCOUNTER
Pt following up on the previous message about chest pains on 3/22 and this morning.  Please advise      TRACIE:[phone]

## 2020-03-25 NOTE — TELEPHONE ENCOUNTER
Patient stated that he was having some chest pain on Sunday, he took 2 nitroglycerin and it was helpful. He stated that he is not currently experiencing chest pain but his heart is beating a little fast. Please advise.     Phone #: [phone]  Thanks

## 2020-03-26 NOTE — TELEPHONE ENCOUNTER
Patient is returning your call. Please advise. Patient ask that you call his cell as he may be out.      Phone: [phone]

## 2020-03-31 NOTE — TELEPHONE ENCOUNTER
Pt would like to have a virtual visit with Dr Trell Rose concerning his chest pain episodes. Please call him on his mobile #.

## 2020-04-01 ENCOUNTER — VIRTUAL VISIT (OUTPATIENT)
Dept: CARDIOLOGY CLINIC | Age: 70
End: 2020-04-01

## 2020-04-01 DIAGNOSIS — E78.00 PURE HYPERCHOLESTEROLEMIA: ICD-10-CM

## 2020-04-01 DIAGNOSIS — E78.5 DYSLIPIDEMIA: ICD-10-CM

## 2020-04-01 DIAGNOSIS — I25.10 CORONARY ARTERY DISEASE INVOLVING NATIVE CORONARY ARTERY OF NATIVE HEART, ANGINA PRESENCE UNSPECIFIED: Primary | ICD-10-CM

## 2020-04-01 DIAGNOSIS — I10 ESSENTIAL HYPERTENSION: ICD-10-CM

## 2020-04-01 DIAGNOSIS — Z95.5 S/P CORONARY ARTERY STENT PLACEMENT: ICD-10-CM

## 2020-04-01 NOTE — PROGRESS NOTES
[facility] Cardiology Telemedicine Encounter                                                         Pursuant to the emergency declaration under the [facility], [facility] waiver authority and the [employer] Act, this Virtual  Visit was conducted, with patient's consent, to reduce the patient's risk of exposure to COVID-19 and provide continuity of care for an established patient. Services were provided through a video synchronous discussion virtually to substitute for in-person clinic visit. Subjective/HPI:   Stevan Calvo is a 71 y.o. male who was seen by synchronous (real-time) audio-video technology on 4/1/2020. Last seen 1/21/20. Last stress test 2018. Chol 5/15/19. HTN, Dyslipidemia, CAD-stents. Risk factors include: Dyslipidemia, male, HTN. Stevan Calvo reports feeling well. Last week, he reports chest pain, similar to heart burn. He reports taking a nitroglycerin tablet that eased pain w/in an hour, sxs resolved on taking gaviscon. He had similar episode, resolved w/ gaviscon. No recurrent sxs since last Wednesday. He reports staying active w/ gardening. Patient denies any exertional chest pain, dyspnea, palpitations, syncope, orthopnea, edema or paroxysmal nocturnal dyspnea.      PCP Provider  Yamini Hightower MD  Past Medical History:   Diagnosis Date    CAD (coronary artery disease) 1999, 2009    hx of coronary stent- singe vessel- LAD    Cancer ([facility] [address]     prostate    Essential hypertension     GERD (gastroesophageal reflux disease)     Hyperlipidemia     Sleep apnea     CPAP complaint      Past Surgical History:   Procedure Laterality Date    HX CATARACT REMOVAL Bilateral     HX HEART CATHETERIZATION  1999, & 12/21/2009    LAD    HX PTCA      Stent of the LAD in 1999 and again in 2009 with a 3 x 28 mm on 12/21/09 post dilated to 3.25    HX UROLOGICAL  10/2016    prostate biopsy     No Known Allergies   Family History   Problem Relation Age of Onset    Hypertension Mother     Hypertension Father     Heart Disease Father 79        MI    Hypertension Sister     Hypertension Brother     Hypertension Brother       Current Outpatient Medications   Medication Sig    acetaminophen (TYLENOL EXTRA STRENGTH) 500 mg tablet Take  by mouth every six (6) hours as needed for Pain.  simvastatin (ZOCOR) 20 mg tablet Take 1 Tab by mouth nightly.  enalapril (VASOTEC) 2.5 mg tablet Take 1 Tab by mouth daily.  nitroglycerin (NITROSTAT) 0.4 mg SL tablet 1 Tab by SubLINGual route every five (5) minutes as needed for Chest Pain.  pantoprazole (PROTONIX) 40 mg tablet Take 40 mg by mouth daily (with breakfast). Patient takes with food, breakfast    methocarbamol (ROBAXIN) 500 mg tablet TAKE 2 TABLETS BY MOUTH 4 TIMES DAILY AS NEEDED FOR MUSCLE SPASM    aspirin 81 mg tablet Take 162 mg by mouth daily. Indications: stroke prevention     No current facility-administered medications for this visit. There were no vitals filed for this visit.   Social History     Socioeconomic History    Marital status:      Spouse name: Not on file    Number of children: Not on file    Years of education: Not on file    Highest education level: Not on file   Occupational History    Not on file   Social Needs    Financial resource strain: Not on file    Food insecurity     Worry: Not on file     Inability: Not on file    Transportation needs     Medical: Not on file     Non-medical: Not on file   Tobacco Use    Smoking status: Former Smoker     Packs/day: 1.00     Years: 30.00     Pack years: 30.00     Last attempt to quit:      Years since quittin.2    Smokeless tobacco: Never Used   Substance and Sexual Activity    Alcohol use: No    Drug use: No     Comment: none since - hx of casual use marijuana and cocaine    Sexual activity: Not Currently   Lifestyle    Physical activity     Days per week: Not on file     Minutes per session: Not on file    Stress: Not on file   Relationships    Social connections     Talks on phone: Not on file     Gets together: Not on file     Attends Voodoo service: Not on file     Active member of club or organization: Not on file     Attends meetings of clubs or organizations: Not on file     Relationship status: Not on file    Intimate partner violence     Fear of current or ex partner: Not on file     Emotionally abused: Not on file     Physically abused: Not on file     Forced sexual activity: Not on file   Other Topics Concern    Not on file   Social History Narrative    Not on file       Review of Symptoms  11 systems reviewed, negative other than as stated in the HPI    Physical Exam:    Due to this being a TeleHealth evaluation, many elements of the physical examination are unable to be assessed. General: Well developed, in no acute distress, cooperative and alert  HEENT: Pupils equal/round. No marked JVD visible on video. Respiratory: No audible wheezing, no signs of respiratory distress, lips non cyanotic  Extremities:  No edema  Neuro: A&Ox3, speech clear, no facial droop, answering questions appropriately  Skin: Skin color is normal. No rashes or lesions.  Non diaphoretic on visible skin during exam      Cardiology Labs:  Lab Results   Component Value Date/Time    Cholesterol, total 115 01/18/2019 10:52 AM    HDL Cholesterol 34 (L) 01/18/2019 10:52 AM    LDL, calculated 67 01/18/2019 10:52 AM    Triglyceride 71 01/18/2019 10:52 AM       Lab Results   Component Value Date/Time    Sodium 140 02/14/2017 03:15 AM    Potassium 4.1 02/14/2017 03:15 AM    Chloride 105 02/14/2017 03:15 AM    CO2 26 02/14/2017 03:15 AM    Anion gap 9 02/14/2017 03:15 AM    Glucose 130 (H) 02/14/2017 03:15 AM    BUN 12 02/14/2017 03:15 AM    Creatinine 1.48 (H) 02/14/2017 03:15 AM    BUN/Creatinine ratio 8 (L) 02/14/2017 03:15 AM    GFR est AA 58 (L) 02/14/2017 03:15 AM GFR est non-AA 48 (L) 02/14/2017 03:15 AM    Calcium 8.1 (L) 02/14/2017 03:15 AM    Bilirubin, total 0.6 01/18/2019 10:52 AM    AST (SGOT) 20 01/18/2019 10:52 AM    Alk. phosphatase 73 01/18/2019 10:52 AM    Protein, total 7.0 01/18/2019 10:52 AM    Albumin 4.7 01/18/2019 10:52 AM    Globulin 3.2 02/06/2017 10:50 AM    A-G Ratio 1.3 02/06/2017 10:50 AM    ALT (SGPT) 24 01/18/2019 10:52 AM    1. Echo  4/5/06 - EF 55%, RVSP 31 mmHg    2. Myocardial perfusion study  12/3/09 - Abnormal myocardial perfusion of the LAD territory with EF 55%  (2/6/18)(stress)(7.0 METS)(5:01) LVEF 66%  (Normal study)  (2/6/18) exercise Cardiolite that was normal with EF 66%. 3. Cardiac catheterization  12/21/09 - Normal hemodynamics, Single vessel coronary artery disease of the LAD, EF 50% with regional wall motion abnormalities. LAD (99% in stent restenosis from 1999),RCA(insig),LCX(insig)    4. Lipids  4/14/15 - , HDL 34, LDL 76, TG 96  10/20/16- , HDL 39, LDL 76, TG 79  11/17/17- , HDL 36, LDL 84, TG 80  1/18/19- , HDL 34, LDL 67, TG 71  5/15/19- , HDL 32, LDL 67,        Assessment:     Diagnoses and all orders for this visit:    1. Coronary artery disease involving native coronary artery of native heart, angina presence unspecified  -     NUCLEAR CARDIAC STRESS TEST; Future    2. S/P coronary artery stent placement  -     NUCLEAR CARDIAC STRESS TEST; Future    3. Essential hypertension  -     NUCLEAR CARDIAC STRESS TEST; Future    4. Pure hypercholesterolemia  -     NUCLEAR CARDIAC STRESS TEST; Future    5. Dyslipidemia  -     NUCLEAR CARDIAC STRESS TEST; Future        ICD-10-CM ICD-9-CM    1. Coronary artery disease involving native coronary artery of native heart, angina presence unspecified I25.10 414.01 NUCLEAR CARDIAC STRESS TEST   2. S/P coronary artery stent placement Z95.5 V45.82 NUCLEAR CARDIAC STRESS TEST   3. Essential hypertension I10 401.9 NUCLEAR CARDIAC STRESS TEST   4.  Pure hypercholesterolemia E78.00 272.0 NUCLEAR CARDIAC STRESS TEST   5. Dyslipidemia E78.5 272.4 NUCLEAR CARDIAC STRESS TEST     No orders of the defined types were placed in this encounter. Assessment/Plan:     1. CAD s/p stents (C/P)  - Chest discomfort is concerning with hx of proximal LAD lesions last stented in 2009 and 1999.   - He did take nitro and gaviscon, feels as though gaviscon resolved sxs more than nitro. - I favor lexiscan cardiolite while walking.  - Increased  mg/d. 2. HTN  - BP has been good in the past.  - Encouraged him to reduce sodium intake and intermittently check BP at home. 3. Dyslipidemia  - Lipids have been at goal in the past.  - He has a requisition to have lipids checked in July. F/u in 1-2 months. Follow-up and Dispositions    · Return in about 1 month (around 5/1/2020). We discussed the expected course, resolution and complications of the diagnosis(es) in detail. Medication risks, benefits, costs, interactions, and alternatives were discussed as indicated. I advised him to contact the office if his condition worsens, changes or fails to improve as anticipated. He expressed understanding with the diagnosis(es) and plan    Mika Ward    Greater than 20 minutes was spent in direct video patient care, planning and chart review. This visit was conducted using [employer] services.

## 2020-07-20 ENCOUNTER — OFFICE VISIT (OUTPATIENT)
Dept: CARDIOLOGY CLINIC | Age: 70
End: 2020-07-20

## 2020-07-20 VITALS
DIASTOLIC BLOOD PRESSURE: 62 MMHG | BODY MASS INDEX: 28.58 KG/M2 | HEART RATE: 64 BPM | HEIGHT: 69 IN | OXYGEN SATURATION: 97 % | WEIGHT: 193 LBS | SYSTOLIC BLOOD PRESSURE: 116 MMHG | RESPIRATION RATE: 16 BRPM

## 2020-07-20 DIAGNOSIS — Z95.5 S/P CORONARY ARTERY STENT PLACEMENT: ICD-10-CM

## 2020-07-20 DIAGNOSIS — I10 ESSENTIAL HYPERTENSION: ICD-10-CM

## 2020-07-20 DIAGNOSIS — I25.10 CORONARY ARTERY DISEASE INVOLVING NATIVE CORONARY ARTERY OF NATIVE HEART, ANGINA PRESENCE UNSPECIFIED: Primary | ICD-10-CM

## 2020-07-20 DIAGNOSIS — E78.00 PURE HYPERCHOLESTEROLEMIA: ICD-10-CM

## 2020-07-20 RX ORDER — BISMUTH SUBSALICYLATE 262 MG
1 TABLET,CHEWABLE ORAL DAILY
COMMUNITY

## 2020-07-20 NOTE — PROGRESS NOTES
ROOM # 6  Denies any cardiac complaints at this time  Occasional numbness in hands when he wakes up  Visit Vitals  /62 (BP 1 Location: Left arm, BP Patient Position: Sitting)   Pulse 64   Resp 16   Ht 5' 9\" (1.753 m)   Wt 193 lb (87.5 kg)   SpO2 97%   BMI 28.50 kg/m²

## 2020-07-20 NOTE — PROGRESS NOTES
LAST OFFICE VISIT : 4/1/2020     ATTENTION:   This medical record was transcribed using an electronic medical records/speech recognition system. Although proofread, it may and can contain electronic, spelling and other errors. Corrections may be executed at a later time. Please feel free to contact us for any clarifications as needed. ICD-10-CM ICD-9-CM   1. Coronary artery disease involving native coronary artery of native heart, angina presence unspecified  I25.10 414.01   2. S/P coronary artery stent placement  Z95.5 V45.82   3. Essential hypertension  I10 401.9   4. Pure hypercholesterolemia  E78.00 272.0            Quincy Barros is a 79 y.o. male with  referred for   chest discomfort history of coronary artery disease hypertension and dyslipidemia. .  The patient denies chest pain/ shortness of breath, orthopnea, PND, LE edema, palpitations, syncope, presyncope or fatigue. I have personally obtained the history from the patient. HISTORY OF PRESENTING ILLNESS      Quincy Barros reports doing well and has not experienced any further chest discomfort. He is attempting to walk regularly. On today we reviewed all of his cardiac testing.        ACTIVE PROBLEM LIST     Patient Active Problem List    Diagnosis Date Noted    Dyslipidemia 04/01/2020    S/P coronary artery stent placement 04/01/2020    Prostate cancer ([facility] [address] 02/13/2017    Coronary artery disease involving native coronary artery of native heart 10/15/2010    Essential hypertension 10/15/2010    Pure hypercholesterolemia 10/15/2010           PAST MEDICAL HISTORY     Past Medical History:   Diagnosis Date    CAD (coronary artery disease) 1999, 2009    hx of coronary stent- singe vessel- LAD    Cancer ([facility] [address]     prostate    Essential hypertension     GERD (gastroesophageal reflux disease)     Hyperlipidemia     Sleep apnea     CPAP complaint           PAST SURGICAL HISTORY     Past Surgical History:   Procedure Laterality Date    HX CATARACT REMOVAL Bilateral     HX HEART CATHETERIZATION  , & 2009    LAD    HX PTCA      Stent of the LAD in  and again in  with a 3 x 28 mm on 09 post dilated to 3.25    HX UROLOGICAL  10/2016    prostate biopsy          ALLERGIES     No Known Allergies       FAMILY HISTORY     Family History   Problem Relation Age of Onset    Hypertension Mother     Hypertension Father     Heart Disease Father 79        MI    Hypertension Sister     Hypertension Brother     Hypertension Brother     negative for cardiac disease       SOCIAL HISTORY     Social History     Socioeconomic History    Marital status:      Spouse name: Not on file    Number of children: Not on file    Years of education: Not on file    Highest education level: Not on file   Tobacco Use    Smoking status: Former Smoker     Packs/day: 1.00     Years: 30.00     Pack years: 30.00     Last attempt to quit:      Years since quittin.5    Smokeless tobacco: Never Used   Substance and Sexual Activity    Alcohol use: No    Drug use: No     Comment: none since - hx of casual use marijuana and cocaine    Sexual activity: Not Currently         MEDICATIONS     Current Outpatient Medications   Medication Sig    enalapril (VASOTEC) 2.5 mg tablet TAKE 1 TABLET DAILY    simvastatin (ZOCOR) 20 mg tablet TAKE 1 TABLET NIGHTLY    methocarbamol (ROBAXIN) 500 mg tablet TAKE 2 TABLETS BY MOUTH 4 TIMES DAILY AS NEEDED FOR MUSCLE SPASM    acetaminophen (TYLENOL EXTRA STRENGTH) 500 mg tablet Take  by mouth every six (6) hours as needed for Pain.  nitroglycerin (NITROSTAT) 0.4 mg SL tablet 1 Tab by SubLINGual route every five (5) minutes as needed for Chest Pain.  pantoprazole (PROTONIX) 40 mg tablet Take 40 mg by mouth daily (with breakfast). Patient takes with food, breakfast    aspirin 81 mg tablet Take 162 mg by mouth daily.  Indications: stroke prevention     No current facility-administered medications for this visit. I have reviewed the nurses notes, vitals, problem list, allergy list, medical history, family, social history and medications. REVIEW OF SYMPTOMS      General: Pt denies excessive weight gain or loss. Pt is able to conduct ADL's  HEENT: Denies blurred vision, headaches, hearing loss, epistaxis and difficulty swallowing. Respiratory: Denies cough, congestion, shortness of breath, DELONG, wheezing or stridor. Cardiovascular: Denies precordial pain, palpitations, edema or PND  Gastrointestinal: Denies poor appetite, indigestion, abdominal pain or blood in stool  Genitourinary: Denies hematuria, dysuria, increased urinary frequency  Musculoskeletal: Denies joint pain or swelling from muscles or joints  Neurologic: Denies tremor, paresthesias, headache, or sensory motor disturbance  Psychiatric: Denies confusion, insomnia, depression  Integumentray: Denies rash, itching or ulcers. Hematologic: Denies easy bruising, bleeding     PHYSICAL EXAMINATION      There were no vitals filed for this visit. General: Well developed, in no acute distress. HEENT: No jaundice, oral mucosa moist, no oral ulcers  Neck: Supple, no stiffness, no lymphadenopathy, supple  Heart:  Normal S1/S2 negative S3 or S4. Regular, no murmur, gallop or rub, no jugular venous distention  Respiratory: Clear bilaterally x 4, no wheezing or rales  Abdomen:   Soft, non-tender, bowel sounds are active.   Extremities:  No edema, normal cap refill, no cyanosis. Musculoskeletal: No clubbing, no deformities  Neuro: A&Ox3, speech clear, gait stable, cooperative, no focal neurologic deficits  Skin: Skin color is normal. No rashes or lesions.  Non diaphoretic, moist.  Vascular: 2+ pulses symmetric in all extremities        EKG:      DIAGNOSTIC DATA     1. Echo  4/5/06 - EF 55%, RVSP 31 mmHg    2. Myocardial perfusion study  12/3/09 - Abnormal myocardial perfusion of the LAD territory with EF 55%  (2/6/18)(stress)(7.0 METS)(5:01) LVEF 66%  (Normal study)  (2/6/18) exercise Cardiolite that was normal with EF 66%. 5/19/20- Lexiscan- Abnormal myocardial perfusion imaging. Fixed defect consistent with prior myocardial infarction    3. Cardiac catheterization  12/21/09 - Normal hemodynamics, Single vessel coronary artery disease of the LAD, EF 50% with regional wall motion abnormalities. LAD (99% in stent restenosis from 1999),RCA(insig),LCX(insig)    4. Lipids  4/14/15 - , HDL 34, LDL 76, TG 96  10/20/16- , HDL 39, LDL 76, TG 79  11/17/17- , HDL 36, LDL 84, TG 80  1/18/19- , HDL 34, LDL 67, TG 71  5/15/19- , HDL 32, LDL 67,            LABORATORY DATA            Lab Results   Component Value Date/Time    WBC 11.3 (H) 02/14/2017 03:15 AM    HGB 13.7 02/14/2017 03:15 AM    HCT 41.8 02/14/2017 03:15 AM    PLATELET 019 96/74/1089 03:15 AM    MCV 87.6 02/14/2017 03:15 AM      Lab Results   Component Value Date/Time    Sodium 140 02/14/2017 03:15 AM    Potassium 4.1 02/14/2017 03:15 AM    Chloride 105 02/14/2017 03:15 AM    CO2 26 02/14/2017 03:15 AM    Anion gap 9 02/14/2017 03:15 AM    Glucose 130 (H) 02/14/2017 03:15 AM    BUN 12 02/14/2017 03:15 AM    Creatinine 1.48 (H) 02/14/2017 03:15 AM    BUN/Creatinine ratio 8 (L) 02/14/2017 03:15 AM    GFR est AA 58 (L) 02/14/2017 03:15 AM    GFR est non-AA 48 (L) 02/14/2017 03:15 AM    Calcium 8.1 (L) 02/14/2017 03:15 AM    Bilirubin, total 0.6 01/18/2019 10:52 AM    Alk.  phosphatase 73 01/18/2019 10:52 AM    Protein, total 7.0 01/18/2019 10:52 AM    Albumin 4.7 01/18/2019 10:52 AM    Globulin 3.2 02/06/2017 10:50 AM    A-G Ratio 1.3 02/06/2017 10:50 AM    ALT (SGPT) 24 01/18/2019 10:52 AM           ASSESSMENT/RECOMMENDATIONS:.      1. CAD s/p stents (C/P)  -No further chest discomfort  -Lexiscan Cardiolite revealed old scar but nothing to suggest that he has any reversible defects and reduce his aspirin to 81 mg a day  -Talked about the importance of risk factor modification and reducing his red meat intake and exercising regularly    2. HTN  - BP is good today no adjustments antihypertensives. - Encouraged him to reduce sodium intake and intermittently check BP at home. 3. Dyslipidemia  - Lipids have been at goal in the past.  -Give him another lab slip today because is been over a year since his cholesterols been checked    No orders of the defined types were placed in this encounter. Follow-up and Dispositions  ·   Return in about 6 months (around 1/20/2021). I have discussed the diagnosis with  Davion Myas and the intended plan as seen in the above orders. Questions were answered concerning future plans. I have discussed medication side effects and warnings with the patient as well. Thank you,  Tank Brown MD for involving me in the care of  Davion Mays. Please do not hesitate to contact me for further questions/concerns. Kayden Doan MD, [address] [place] [address] Rd., [address]      [facility], [address] [place] [address]     [facility]      [phone] / [phone] Fax

## 2020-09-29 NOTE — LETTER
1/21/20 Patient: Priya Hirsch YOB: 1950 Date of Visit: 1/21/2020 Stephanie Shane MD 
[place] [id number] [facility] [address] VIA Facsimile: [phone] Dear Stephanie Shane MD, Thank you for referring Mr. Kobe Adams to [address]  for evaluation. My notes for this consultation are attached. If you have questions, please do not hesitate to call me. I look forward to following your patient along with you.  
 
 
Sincerely, 
 
Ros Layton MD 
 

same name as above

## 2020-11-12 RX ORDER — SIMVASTATIN 20 MG/1
TABLET, FILM COATED ORAL
Qty: 90 TAB | Refills: 2 | Status: SHIPPED | OUTPATIENT
Start: 2020-11-12

## 2020-11-12 RX ORDER — ENALAPRIL MALEATE 2.5 MG/1
TABLET ORAL
Qty: 90 TAB | Refills: 2 | Status: SHIPPED | OUTPATIENT
Start: 2020-11-12

## 2020-11-25 ENCOUNTER — DOCUMENTATION ONLY (OUTPATIENT)
Dept: CARDIOLOGY CLINIC | Age: 70
End: 2020-11-25

## 2020-11-25 NOTE — PROGRESS NOTES
Lipids  11/17/20- , HDL 37, LDL 70, TG 93    Pt takes Simvastatin 20 mg daily. Labs drawn at [facility] per you.

## 2021-01-25 DIAGNOSIS — E78.00 PURE HYPERCHOLESTEROLEMIA: Primary | ICD-10-CM

## 2021-01-25 DIAGNOSIS — I25.10 CORONARY ARTERY DISEASE INVOLVING NATIVE CORONARY ARTERY OF NATIVE HEART, ANGINA PRESENCE UNSPECIFIED: ICD-10-CM

## 2021-01-26 ENCOUNTER — OFFICE VISIT (OUTPATIENT)
Dept: CARDIOLOGY CLINIC | Age: 71
End: 2021-01-26
Payer: MEDICARE

## 2021-01-26 VITALS
OXYGEN SATURATION: 99 % | WEIGHT: 196 LBS | HEART RATE: 71 BPM | HEIGHT: 69 IN | DIASTOLIC BLOOD PRESSURE: 78 MMHG | BODY MASS INDEX: 29.03 KG/M2 | SYSTOLIC BLOOD PRESSURE: 122 MMHG | RESPIRATION RATE: 18 BRPM

## 2021-01-26 DIAGNOSIS — E78.00 PURE HYPERCHOLESTEROLEMIA: ICD-10-CM

## 2021-01-26 DIAGNOSIS — I25.10 CORONARY ARTERY DISEASE INVOLVING NATIVE CORONARY ARTERY OF NATIVE HEART, ANGINA PRESENCE UNSPECIFIED: Primary | ICD-10-CM

## 2021-01-26 DIAGNOSIS — I10 ESSENTIAL HYPERTENSION: ICD-10-CM

## 2021-01-26 DIAGNOSIS — Z95.5 S/P CORONARY ARTERY STENT PLACEMENT: ICD-10-CM

## 2021-01-26 PROCEDURE — 3017F COLORECTAL CA SCREEN DOC REV: CPT | Performed by: SPECIALIST

## 2021-01-26 PROCEDURE — G8754 DIAS BP LESS 90: HCPCS | Performed by: SPECIALIST

## 2021-01-26 PROCEDURE — G8752 SYS BP LESS 140: HCPCS | Performed by: SPECIALIST

## 2021-01-26 PROCEDURE — G8419 CALC BMI OUT NRM PARAM NOF/U: HCPCS | Performed by: SPECIALIST

## 2021-01-26 PROCEDURE — 1101F PT FALLS ASSESS-DOCD LE1/YR: CPT | Performed by: SPECIALIST

## 2021-01-26 PROCEDURE — 99214 OFFICE O/P EST MOD 30 MIN: CPT | Performed by: SPECIALIST

## 2021-01-26 PROCEDURE — G8536 NO DOC ELDER MAL SCRN: HCPCS | Performed by: SPECIALIST

## 2021-01-26 PROCEDURE — G8427 DOCREV CUR MEDS BY ELIG CLIN: HCPCS | Performed by: SPECIALIST

## 2021-01-26 PROCEDURE — G8432 DEP SCR NOT DOC, RNG: HCPCS | Performed by: SPECIALIST

## 2021-01-26 PROCEDURE — G0463 HOSPITAL OUTPT CLINIC VISIT: HCPCS | Performed by: SPECIALIST

## 2021-01-26 NOTE — PROGRESS NOTES
Magda Aaron is a 79 y.o. male  Chief Complaint   Patient presents with    Follow-up     6 month     Health Maintenance Due   Topic Date Due    Hepatitis C Screening  1950    COVID-19 Vaccine (1 of 2) 04/06/1966    DTaP/Tdap/Td series (1 - Tdap) 04/06/1971    Shingrix Vaccine Age 50> (1 of 2) 04/06/2000    Colorectal Cancer Screening Combo  04/06/2000    GLAUCOMA SCREENING Q2Y  04/06/2015    AAA Screening 73-67 YO Male Smoking Patients  04/06/2015    Pneumococcal 65+ years (1 of 1 - PPSV23) 04/06/2015    Medicare Yearly Exam  03/14/2018    Lipid Screen  05/15/2020    Flu Vaccine (1) 09/01/2020     Visit Vitals  /78 (BP 1 Location: Left arm, BP Patient Position: Sitting)   Pulse 71   Resp 18   Ht 5' 9\" (1.753 m)   Wt 196 lb (88.9 kg)   SpO2 99%   BMI 28.94 kg/m²

## 2021-01-26 NOTE — PROGRESS NOTES
LAST OFFICE VISIT : 4/1/2020     ATTENTION:   This medical record was transcribed using an electronic medical records/speech recognition system. Although proofread, it may and can contain electronic, spelling and other errors. Corrections may be executed at a later time. Please feel free to contact us for any clarifications as needed. ICD-10-CM ICD-9-CM   1. Coronary artery disease involving native coronary artery of native heart, angina presence unspecified  I25.10 414.01   2. S/P coronary artery stent placement  Z95.5 V45.82   3. Essential hypertension  I10 401.9   4. Pure hypercholesterolemia  E78.00 272.0            Daren Alexis is a [address] y.o. male with  referred for   chest discomfort history of coronary artery disease hypertension and dyslipidemia. .  The patient denies chest pain/ shortness of breath, orthopnea, PND, LE edema, palpitations, syncope, presyncope or fatigue. I have personally obtained the history from the patient. HISTORY OF PRESENTING ILLNESS      He is doing well since I last saw him no interval cardiac complaints. He is not exercising. His cholesterol profile is excellent.      ACTIVE PROBLEM LIST     Patient Active Problem List    Diagnosis Date Noted    Dyslipidemia 04/01/2020    S/P coronary artery stent placement 04/01/2020    Prostate cancer ([facility] [address] 02/13/2017    Coronary artery disease involving native coronary artery of native heart 10/15/2010    Essential hypertension 10/15/2010    Pure hypercholesterolemia 10/15/2010           PAST MEDICAL HISTORY     Past Medical History:   Diagnosis Date    CAD (coronary artery disease) 1999, 2009    hx of coronary stent- singe vessel- LAD    Cancer ([facility] [address]     prostate    Essential hypertension     GERD (gastroesophageal reflux disease)     Hyperlipidemia     Sleep apnea     CPAP complaint           PAST SURGICAL HISTORY     Past Surgical History:   Procedure Laterality Date    HX CATARACT REMOVAL Bilateral  HX HEART CATHETERIZATION  , & 2009    LAD    HX PTCA      Stent of the LAD in  and again in  with a 3 x 28 mm on 09 post dilated to 3.25    HX UROLOGICAL  10/2016    prostate biopsy          ALLERGIES     No Known Allergies       FAMILY HISTORY     Family History   Problem Relation Age of Onset    Hypertension Mother     Hypertension Father     Heart Disease Father 79        MI    Hypertension Sister     Hypertension Brother     Hypertension Brother     negative for cardiac disease       SOCIAL HISTORY     Social History     Socioeconomic History    Marital status:      Spouse name: Not on file    Number of children: Not on file    Years of education: Not on file    Highest education level: Not on file   Tobacco Use    Smoking status: Former Smoker     Packs/day: 1.00     Years: 30.00     Pack years: 30.00     Quit date:      Years since quittin.0    Smokeless tobacco: Never Used   Substance and Sexual Activity    Alcohol use: No    Drug use: No     Comment: none since - hx of casual use marijuana and cocaine    Sexual activity: Not Currently         MEDICATIONS     Current Outpatient Medications   Medication Sig    enalapril (VASOTEC) 2.5 mg tablet TAKE 1 TABLET DAILY    simvastatin (ZOCOR) 20 mg tablet TAKE 1 TABLET NIGHTLY    multivitamin (ONE A DAY) tablet Take 1 Tab by mouth daily.  acetaminophen (TYLENOL EXTRA STRENGTH) 500 mg tablet Take  by mouth every six (6) hours as needed for Pain.  nitroglycerin (NITROSTAT) 0.4 mg SL tablet 1 Tab by SubLINGual route every five (5) minutes as needed for Chest Pain.  pantoprazole (PROTONIX) 40 mg tablet Take 40 mg by mouth daily (with breakfast). Patient takes with food, breakfast    aspirin 81 mg tablet Take 162 mg by mouth daily. Indications: stroke prevention     No current facility-administered medications for this visit.         I have reviewed the nurses notes, vitals, problem list, allergy list, medical history, family, social history and medications. REVIEW OF SYMPTOMS      General: Pt denies excessive weight gain or loss. Pt is able to conduct ADL's  HEENT: Denies blurred vision, headaches, hearing loss, epistaxis and difficulty swallowing. Respiratory: Denies cough, congestion, shortness of breath, DELONG, wheezing or stridor. Cardiovascular: Denies precordial pain, palpitations, edema or PND  Gastrointestinal: Denies poor appetite, indigestion, abdominal pain or blood in stool  Genitourinary: Denies hematuria, dysuria, increased urinary frequency  Musculoskeletal: Denies joint pain or swelling from muscles or joints  Neurologic: Denies tremor, paresthesias, headache, or sensory motor disturbance  Psychiatric: Denies confusion, insomnia, depression  Integumentray: Denies rash, itching or ulcers. Hematologic: Denies easy bruising, bleeding     PHYSICAL EXAMINATION      Vitals:    01/26/21 1305   BP: 122/78   Pulse: 71   Resp: 18   SpO2: 99%   Weight: 196 lb (88.9 kg)   Height: 5' 9\" (1.753 m)     General: Well developed, in no acute distress. HEENT: No jaundice, oral mucosa moist, no oral ulcers  Neck: Supple, no stiffness, no lymphadenopathy, supple  Heart:  Normal S1/S2 negative S3 or S4. Regular, no murmur, gallop or rub, no jugular venous distention  Respiratory: Clear bilaterally x 4, no wheezing or rales  Abdomen:   Soft, non-tender, bowel sounds are active.   Extremities:  No edema, normal cap refill, no cyanosis. Musculoskeletal: No clubbing, no deformities  Neuro: A&Ox3, speech clear, gait stable, cooperative, no focal neurologic deficits  Skin: Skin color is normal. No rashes or lesions.  Non diaphoretic, moist.  Vascular: 2+ pulses symmetric in all extremities             DIAGNOSTIC DATA     1. Echo   4/5/06 - EF 55%, RVSP 31 mmHg     2. Myocardial perfusion study   12/3/09 - Abnormal myocardial perfusion of the LAD territory with EF 55%   (2/6/18)(stress)(7.0 METS)(5:01) LVEF 66% (Normal study)   (2/6/18) exercise Cardiolite that was normal with EF 66%. 5/19/20- Lexiscan- Abnormal myocardial perfusion imaging. Fixed defect consistent with prior myocardial infarction     3. Cardiac catheterization   12/21/09 - Normal hemodynamics, Single vessel coronary artery disease of the LAD, EF 50% with regional wall motion abnormalities. LAD (99% in stent restenosis from 1999),RCA(insig),LCX(insig)     4. Lipids   4/14/15 - , HDL 34, LDL 76, TG 96   10/20/16- , HDL 39, LDL 76, TG 79   11/17/17- , HDL 36, LDL 84, TG 80   1/18/19- , HDL 34, LDL 67, TG 71   5/15/19- , HDL 32, LDL 67,    11/17/20- , HDL 37, LDL 70, TG 93  12/8/20- , HDL 36, LDL 73,            LABORATORY DATA            Lab Results   Component Value Date/Time    WBC 11.3 (H) 02/14/2017 03:15 AM    HGB 13.7 02/14/2017 03:15 AM    HCT 41.8 02/14/2017 03:15 AM    PLATELET 858 09/80/5727 03:15 AM    MCV 87.6 02/14/2017 03:15 AM      Lab Results   Component Value Date/Time    Sodium 140 02/14/2017 03:15 AM    Potassium 4.1 02/14/2017 03:15 AM    Chloride 105 02/14/2017 03:15 AM    CO2 26 02/14/2017 03:15 AM    Anion gap 9 02/14/2017 03:15 AM    Glucose 130 (H) 02/14/2017 03:15 AM    BUN 12 02/14/2017 03:15 AM    Creatinine 1.48 (H) 02/14/2017 03:15 AM    BUN/Creatinine ratio 8 (L) 02/14/2017 03:15 AM    GFR est AA 58 (L) 02/14/2017 03:15 AM    GFR est non-AA 48 (L) 02/14/2017 03:15 AM    Calcium 8.1 (L) 02/14/2017 03:15 AM    Bilirubin, total 0.6 01/18/2019 10:52 AM    Alk. phosphatase 73 01/18/2019 10:52 AM    Protein, total 7.0 01/18/2019 10:52 AM    Albumin 4.7 01/18/2019 10:52 AM    Globulin 3.2 02/06/2017 10:50 AM    A-G Ratio 1.3 02/06/2017 10:50 AM    ALT (SGPT) 24 01/18/2019 10:52 AM           ASSESSMENT/RECOMMENDATIONS:.      1. CAD s/p stents (C/P)  -No cardiac issues  -Again encouraged exercise and work on risk factor modification    2.  HTN  - BP is at goal  -Continue low-sodium diet    3. Dyslipidemia  -LDL is close to 70  -Continue diet low in red meat    No orders of the defined types were placed in this encounter. Follow-up and Dispositions  ·   Return in about 6 months (around 7/26/2021). I have discussed the diagnosis with  Suzi Andre and the intended plan as seen in the above orders. Questions were answered concerning future plans. I have discussed medication side effects and warnings with the patient as well. Thank you,  Varinder Diego MD for involving me in the care of  Suzi Andre. Please do not hesitate to contact me for further questions/concerns. Kayden Doan MD, [facility] [address]      [address] [facility], [address] [place] [address] [facility]      [phone] / [phone] Fax

## 2021-07-13 RX ORDER — NITROGLYCERIN 0.4 MG/1
TABLET SUBLINGUAL
Qty: 25 TABLET | Refills: 0 | Status: SHIPPED | OUTPATIENT
Start: 2021-07-13 | End: 2022-09-07 | Stop reason: SDUPTHER

## 2021-07-27 ENCOUNTER — OFFICE VISIT (OUTPATIENT)
Dept: CARDIOLOGY CLINIC | Age: 71
End: 2021-07-27
Payer: MEDICARE

## 2021-07-27 VITALS
OXYGEN SATURATION: 99 % | HEIGHT: 69 IN | HEART RATE: 68 BPM | DIASTOLIC BLOOD PRESSURE: 70 MMHG | WEIGHT: 190 LBS | SYSTOLIC BLOOD PRESSURE: 132 MMHG | BODY MASS INDEX: 28.14 KG/M2

## 2021-07-27 DIAGNOSIS — I10 ESSENTIAL HYPERTENSION: ICD-10-CM

## 2021-07-27 DIAGNOSIS — I25.10 CORONARY ARTERY DISEASE INVOLVING NATIVE HEART WITHOUT ANGINA PECTORIS, UNSPECIFIED VESSEL OR LESION TYPE: ICD-10-CM

## 2021-07-27 DIAGNOSIS — Z95.5 S/P CORONARY ARTERY STENT PLACEMENT: Primary | ICD-10-CM

## 2021-07-27 DIAGNOSIS — E78.00 PURE HYPERCHOLESTEROLEMIA: ICD-10-CM

## 2021-07-27 PROCEDURE — 1101F PT FALLS ASSESS-DOCD LE1/YR: CPT | Performed by: SPECIALIST

## 2021-07-27 PROCEDURE — G8536 NO DOC ELDER MAL SCRN: HCPCS | Performed by: SPECIALIST

## 2021-07-27 PROCEDURE — 3017F COLORECTAL CA SCREEN DOC REV: CPT | Performed by: SPECIALIST

## 2021-07-27 PROCEDURE — G8754 DIAS BP LESS 90: HCPCS | Performed by: SPECIALIST

## 2021-07-27 PROCEDURE — G8419 CALC BMI OUT NRM PARAM NOF/U: HCPCS | Performed by: SPECIALIST

## 2021-07-27 PROCEDURE — G0463 HOSPITAL OUTPT CLINIC VISIT: HCPCS | Performed by: SPECIALIST

## 2021-07-27 PROCEDURE — G8427 DOCREV CUR MEDS BY ELIG CLIN: HCPCS | Performed by: SPECIALIST

## 2021-07-27 PROCEDURE — 99214 OFFICE O/P EST MOD 30 MIN: CPT | Performed by: SPECIALIST

## 2021-07-27 PROCEDURE — G8752 SYS BP LESS 140: HCPCS | Performed by: SPECIALIST

## 2021-07-27 PROCEDURE — G8510 SCR DEP NEG, NO PLAN REQD: HCPCS | Performed by: SPECIALIST

## 2021-07-27 NOTE — PROGRESS NOTES
Room 0    Visit Vitals  /70 (BP 1 Location: Left upper arm, BP Patient Position: Sitting, BP Cuff Size: Adult)   Pulse 68   Ht 5' 9\" (1.753 m)   Wt 190 lb (86.2 kg)   SpO2 99%   BMI 28.06 kg/m²       Chest pain: no  Shortness of breath: no  Edema: no  Palpitations: no  Dizziness: no    New diagnosis/Surgeries: no    ER/Hospitalizations: [address] [facility] for inflamm in chest    Refills: no

## 2021-07-27 NOTE — PROGRESS NOTES
LAST OFFICE VISIT : 4/1/2020     ATTENTION:   This medical record was transcribed using an electronic medical records/speech recognition system. Although proofread, it may and can contain electronic, spelling and other errors. Corrections may be executed at a later time. Please feel free to contact us for any clarifications as needed. ICD-10-CM ICD-9-CM   1. S/P coronary artery stent placement  Z95.5 V45.82   2. Essential hypertension  I10 401.9   3. Pure hypercholesterolemia  E78.00 272.0   4. Coronary artery disease involving native heart without angina pectoris, unspecified vessel or lesion type  I25.10 414.01            Espinoza Ash is a 70 y.o. male with  referred for   chest discomfort history of coronary artery disease hypertension and dyslipidemia. .     I have personally obtained the history from the patient. HISTORY OF PRESENTING ILLNESS      He states he is doing well not having any chest discomfort or shortness of breath. He apparently according to his wife is extremely active and rides his bike. Out 12 years from his last intervention which was in-stent restenosis of the LAD lesion that was stented back in 99.      ACTIVE PROBLEM LIST     Patient Active Problem List    Diagnosis Date Noted    Dyslipidemia 04/01/2020    S/P coronary artery stent placement 04/01/2020    Prostate cancer ([facility] [address] 02/13/2017    Coronary artery disease involving native coronary artery of native heart 10/15/2010    Essential hypertension 10/15/2010    Pure hypercholesterolemia 10/15/2010           PAST MEDICAL HISTORY     Past Medical History:   Diagnosis Date    CAD (coronary artery disease) 1999, 2009    hx of coronary stent- singe vessel- LAD    Cancer ([facility] [address]     prostate    Essential hypertension     GERD (gastroesophageal reflux disease)     Hyperlipidemia     Sleep apnea     CPAP complaint           PAST SURGICAL HISTORY     Past Surgical History:   Procedure Laterality Date    HX CATARACT REMOVAL Bilateral     HX HEART CATHETERIZATION  , & 2009    LAD    HX PTCA      Stent of the LAD in  and again in  with a 3 x 28 mm on 09 post dilated to 3.25    HX UROLOGICAL  10/2016    prostate biopsy          ALLERGIES     No Known Allergies       FAMILY HISTORY     Family History   Problem Relation Age of Onset    Hypertension Mother     Hypertension Father     Heart Disease Father 79        MI    Hypertension Sister     Hypertension Brother     Hypertension Brother     negative for cardiac disease       SOCIAL HISTORY     Social History     Socioeconomic History    Marital status:      Spouse name: Not on file    Number of children: Not on file    Years of education: Not on file    Highest education level: Not on file   Tobacco Use    Smoking status: Former Smoker     Packs/day: 1.00     Years: 30.00     Pack years: 30.00     Quit date:      Years since quittin.5    Smokeless tobacco: Never Used   Substance and Sexual Activity    Alcohol use: No    Drug use: No     Comment: none since - hx of casual use marijuana and cocaine    Sexual activity: Not Currently     Social Determinants of Health     Financial Resource Strain:     Difficulty of Paying Living Expenses:    Food Insecurity:     Worried About Running Out of Food in the Last Year:     Ran Out of Food in the Last Year:    Transportation Needs:     Lack of Transportation (Medical):      Lack of Transportation (Non-Medical):    Physical Activity:     Days of Exercise per Week:     Minutes of Exercise per Session:    Stress:     Feeling of Stress :    Social Connections:     Frequency of Communication with Friends and Family:     Frequency of Social Gatherings with Friends and Family:     Attends [employer]ism Services:     Active Member of Clubs or Organizations:     Attends Club or Organization Meetings:     Marital Status:          MEDICATIONS     Current Outpatient Medications   Medication Sig    nitroglycerin (NITROSTAT) 0.4 mg SL tablet DISSOLVE ONE TABLET UNDER THE TONGUE EVERY 5 MINUTES AS NEEDED FOR CHEST PAIN. DO NOT EXCEED A TOTAL OF 3 DOSES IN 15 MINUTES    enalapril (VASOTEC) 2.5 mg tablet TAKE 1 TABLET DAILY    simvastatin (ZOCOR) 20 mg tablet TAKE 1 TABLET NIGHTLY    multivitamin (ONE A DAY) tablet Take 1 Tab by mouth daily.  acetaminophen (TYLENOL EXTRA STRENGTH) 500 mg tablet Take  by mouth every six (6) hours as needed for Pain.  pantoprazole (PROTONIX) 40 mg tablet Take 40 mg by mouth daily (with breakfast). Patient takes with food, breakfast    aspirin 81 mg tablet Take 81 mg by mouth daily. Indications: stroke prevention     No current facility-administered medications for this visit. I have reviewed the nurses notes, vitals, problem list, allergy list, medical history, family, social history and medications. REVIEW OF SYMPTOMS   Per HPI  General: Pt denies excessive weight gain or loss. Pt is able to conduct ADL's  HEENT: Denies blurred vision, headaches, hearing loss, epistaxis and difficulty swallowing. Respiratory: Denies cough, congestion, shortness of breath, DELONG, wheezing or stridor. Cardiovascular: Denies precordial pain, palpitations, edema or PND  Gastrointestinal: Denies poor appetite, indigestion, abdominal pain or blood in stool  Genitourinary: Denies hematuria, dysuria, increased urinary frequency  Musculoskeletal: Denies joint pain or swelling from muscles or joints  Neurologic: Denies tremor, paresthesias, headache, or sensory motor disturbance  Psychiatric: Denies confusion, insomnia, depression  Integumentray: Denies rash, itching or ulcers. Hematologic: Denies easy bruising, bleeding     PHYSICAL EXAMINATION      Vitals:    07/27/21 1346   BP: 132/70   Pulse: 68   SpO2: 99%   Weight: 190 lb (86.2 kg)   Height: 5' 9\" (1.753 m)     General: Well developed, in no acute distress.   HEENT: No jaundice, oral mucosa moist, no oral ulcers  Neck: Supple, no stiffness, no lymphadenopathy, supple  Heart:  Normal S1/S2 negative S3 or S4. Regular, no murmur, gallop or rub, no jugular venous distention  Respiratory: Clear bilaterally x 4, no wheezing or rales  Extremities:  No edema, normal cap refill, no cyanosis. Musculoskeletal: No clubbing, no deformities  Neuro: A&Ox3, speech clear, gait stable, cooperative, no focal neurologic deficits  Skin: Skin color is normal. No rashes or lesions. Non diaphoretic, moist.         DIAGNOSTIC DATA     1. Echo   4/5/06 - EF 55%, RVSP 31 mmHg     2. Myocardial perfusion study   12/3/09 - Abnormal myocardial perfusion of the LAD territory with EF 55%   (2/6/18)(stress)(7.0 METS)(5:01) LVEF 66%   (Normal study)   (2/6/18) exercise Cardiolite that was normal with EF 66%. 5/19/20- Lexiscan- Abnormal myocardial perfusion imaging. Fixed defect consistent with prior myocardial infarction     3. Cardiac catheterization   12/21/09 - Normal hemodynamics, Single vessel coronary artery disease of the LAD, EF 50% with regional wall motion abnormalities. LAD (99% in stent restenosis from 1999),RCA(insig),LCX(insig)     4.  Lipids   4/14/15 - , HDL 34, LDL 76, TG 96   10/20/16- , HDL 39, LDL 76, TG 79   11/17/17- , HDL 36, LDL 84, TG 80   1/18/19- , HDL 34, LDL 67, TG 71   5/15/19- , HDL 32, LDL 67,    11/17/20- , HDL 37, LDL 70, TG 93   12/8/20- , HDL 36, LDL 73,   6/4/21- , HDL 36, LDL 76, TG 82           LABORATORY DATA            Lab Results   Component Value Date/Time    WBC 11.3 (H) 02/14/2017 03:15 AM    HGB 13.7 02/14/2017 03:15 AM    HCT 41.8 02/14/2017 03:15 AM    PLATELET 310 81/83/2580 03:15 AM    MCV 87.6 02/14/2017 03:15 AM      Lab Results   Component Value Date/Time    Sodium 140 02/14/2017 03:15 AM    Potassium 4.1 02/14/2017 03:15 AM    Chloride 105 02/14/2017 03:15 AM    CO2 26 02/14/2017 03:15 AM    Anion gap 9 02/14/2017 03:15 AM Glucose 130 (H) 02/14/2017 03:15 AM    BUN 12 02/14/2017 03:15 AM    Creatinine 1.48 (H) 02/14/2017 03:15 AM    BUN/Creatinine ratio 8 (L) 02/14/2017 03:15 AM    GFR est AA 58 (L) 02/14/2017 03:15 AM    GFR est non-AA 48 (L) 02/14/2017 03:15 AM    Calcium 8.1 (L) 02/14/2017 03:15 AM    Bilirubin, total 0.6 01/18/2019 10:52 AM    Alk. phosphatase 73 01/18/2019 10:52 AM    Protein, total 7.0 01/18/2019 10:52 AM    Albumin 4.7 01/18/2019 10:52 AM    Globulin 3.2 02/06/2017 10:50 AM    A-G Ratio 1.3 02/06/2017 10:50 AM    ALT (SGPT) 24 01/18/2019 10:52 AM           ASSESSMENT/RECOMMENDATIONS:.      1. CAD s/p stents (C/P)  -Last stenting was done about 12 years ago  -Rising regularly with no chest discomfort  -Eat healthy and clean and exercise    2. HTN  - BP good on current medical regimen no adjustments antihypertensives  -Sodium intake    3. Dyslipidemia  -LDL goal is under 70. With me in 6 months or as needed    No orders of the defined types were placed in this encounter. Follow-up and Dispositions  ·   Return in about 6 months (around 1/27/2022). I have discussed the diagnosis with  Petar Collins and the intended plan as seen in the above orders. Questions were answered concerning future plans. I have discussed medication side effects and warnings with the patient as well. Thank you,  Yaritza Bailey MD for involving me in the care of  Petar Collins. Please do not hesitate to contact me for further questions/concerns. Kayden Doan MD, [facility] [address]      [facility], [address] [place] [address], [facility] [address]      [phone] / [phone] Fax 0

## 2022-03-18 PROBLEM — C61 PROSTATE CANCER (HCC): Status: ACTIVE | Noted: 2017-02-13

## 2022-03-19 PROBLEM — E78.5 DYSLIPIDEMIA: Status: ACTIVE | Noted: 2020-04-01

## 2022-03-19 PROBLEM — Z95.5 S/P CORONARY ARTERY STENT PLACEMENT: Status: ACTIVE | Noted: 2020-04-01

## 2022-04-18 NOTE — PROGRESS NOTES
CARDIOLOGY OFFICE NOTE    Kayden Mendez MD, [address], [place], [address] [facility]  Phone [phone]; Fax [phone]  Mobile [phone]   Voice Mail [phone]         ATTENTION:   This medical record was transcribed using an electronic medical records/speech recognition system. Although proofread, it may and can contain electronic, spelling and other errors. Corrections may be executed at a later time. Please feel free to contact us for any clarifications as needed. ICD-10-CM ICD-9-CM   1. S/P coronary artery stent placement  Z95.5 V45.82   2. Essential hypertension  I10 401.9   3. Dyslipidemia  E78.5 272.4            David Bhatt is a 67 y.o. male with  referred for   chest discomfort history of coronary artery disease hypertension and dyslipidemia. .     I have personally obtained the history from the patient. HISTORY OF PRESENTING ILLNESS      He seems to be doing well overall. He is now about 13 years out from his stenting and has no chest pain or shortness of breath.      ACTIVE PROBLEM LIST     Patient Active Problem List    Diagnosis Date Noted    Dyslipidemia 04/01/2020    S/P coronary artery stent placement 04/01/2020    Prostate cancer ([facility] [address] 02/13/2017    Coronary artery disease involving native coronary artery of native heart 10/15/2010    Essential hypertension 10/15/2010    Pure hypercholesterolemia 10/15/2010           PAST MEDICAL HISTORY     Past Medical History:   Diagnosis Date    CAD (coronary artery disease) 1999, 2009    hx of coronary stent- singe vessel- LAD    Cancer ([address]     prostate    Essential hypertension     GERD (gastroesophageal reflux disease)     Hyperlipidemia     Sleep apnea     CPAP complaint           PAST SURGICAL HISTORY     Past Surgical History:   Procedure Laterality Date    HX CATARACT REMOVAL Bilateral     HX HEART CATHETERIZATION  1999, & 12/21/2009    LAD    HX PTCA      Stent of the LAD in  and again in  with a 3 x 28 mm on 09 post dilated to 3.25    HX UROLOGICAL  10/2016    prostate biopsy          ALLERGIES     No Known Allergies       FAMILY HISTORY     Family History   Problem Relation Age of Onset    Hypertension Mother     Hypertension Father     Heart Disease Father 79        MI    Hypertension Sister     Hypertension Brother     Hypertension Brother     negative for cardiac disease       SOCIAL HISTORY     Social History     Socioeconomic History    Marital status:    Tobacco Use    Smoking status: Former Smoker     Packs/day: 1.00     Years: 30.00     Pack years: 30.00     Quit date:      Years since quittin.3    Smokeless tobacco: Never Used   Substance and Sexual Activity    Alcohol use: No    Drug use: No     Comment: none since - hx of casual use marijuana and cocaine    Sexual activity: Not Currently         MEDICATIONS     Current Outpatient Medications   Medication Sig    nitroglycerin (NITROSTAT) 0.4 mg SL tablet DISSOLVE ONE TABLET UNDER THE TONGUE EVERY 5 MINUTES AS NEEDED FOR CHEST PAIN. DO NOT EXCEED A TOTAL OF 3 DOSES IN 15 MINUTES    enalapril (VASOTEC) 2.5 mg tablet TAKE 1 TABLET DAILY    simvastatin (ZOCOR) 20 mg tablet TAKE 1 TABLET NIGHTLY    multivitamin (ONE A DAY) tablet Take 1 Tab by mouth daily.  pantoprazole (PROTONIX) 40 mg tablet Take 40 mg by mouth daily (with breakfast). Patient takes with food, breakfast    aspirin 81 mg tablet Take 81 mg by mouth daily. Indications: stroke prevention    acetaminophen (TYLENOL EXTRA STRENGTH) 500 mg tablet Take  by mouth every six (6) hours as needed for Pain. No current facility-administered medications for this visit. I have reviewed the nurses notes, vitals, problem list, allergy list, medical history, family, social history and medications. REVIEW OF SYMPTOMS   Per HPI  General: Pt denies excessive weight gain or loss.  Pt is able to conduct ADL's  HEENT: Denies blurred vision, headaches, hearing loss, epistaxis and difficulty swallowing. Respiratory: Denies cough, congestion, shortness of breath, DELONG, wheezing or stridor. Cardiovascular: Denies precordial pain, palpitations, edema or PND  Gastrointestinal: Denies poor appetite, indigestion, abdominal pain or blood in stool  Genitourinary: Denies hematuria, dysuria, increased urinary frequency  Musculoskeletal: Denies joint pain or swelling from muscles or joints  Neurologic: Denies tremor, paresthesias, headache, or sensory motor disturbance  Psychiatric: Denies confusion, insomnia, depression  Integumentray: Denies rash, itching or ulcers. Hematologic: Denies easy bruising, bleeding     PHYSICAL EXAMINATION      Vitals:    04/19/22 1322   BP: 120/72   Pulse: 61   Resp: 18   SpO2: 99%   Weight: 190 lb (86.2 kg)   Height: 5' 9\" (1.753 m)     General: Well developed, in no acute distress. HEENT: No jaundice, oral mucosa moist, no oral ulcers  Neck: Supple, no stiffness, no lymphadenopathy, supple  Heart:  Normal S1/S2 negative S3 or S4. Regular, no murmur, gallop or rub, no jugular venous distention  Respiratory: Clear bilaterally x 4, no wheezing or rales  Extremities:  No edema, normal cap refill, no cyanosis. Musculoskeletal: No clubbing, no deformities  Neuro: A&Ox3, speech clear, gait stable, cooperative, no focal neurologic deficits  Skin: Skin color is normal. No rashes or lesions. Non diaphoretic, moist.         DIAGNOSTIC DATA     1. Echo   4/5/06 - EF 55%, RVSP 31 mmHg     2. Myocardial perfusion study   12/3/09 - Abnormal myocardial perfusion of the LAD territory with EF 55%   (2/6/18)(stress)(7.0 METS)(5:01) LVEF 66%   (Normal study)   (2/6/18) exercise Cardiolite that was normal with EF 66%. 5/19/20- Lexiscan- Abnormal myocardial perfusion imaging. Fixed defect consistent with prior myocardial infarction     3.  Cardiac catheterization   12/21/09 - Normal hemodynamics, Single vessel coronary artery disease of the LAD, EF 50% with regional wall motion abnormalities. LAD (99% in stent restenosis from 1999),RCA(insig),LCX(insig)     4. Lipids   4/14/15 - , HDL 34, LDL 76, TG 96   10/20/16- , HDL 39, LDL 76, TG 79   11/17/17- , HDL 36, LDL 84, TG 80   1/18/19- , HDL 34, LDL 67, TG 71   5/15/19- , HDL 32, LDL 67,    11/17/20- , HDL 37, LDL 70, TG 93   12/8/20- , HDL 36, LDL 73,   6/4/21- , HDL 36, LDL 76, TG 82  11/30/21- , HDL 34, ,            LABORATORY DATA            Lab Results   Component Value Date/Time    WBC 11.3 (H) 02/14/2017 03:15 AM    HGB 13.7 02/14/2017 03:15 AM    HCT 41.8 02/14/2017 03:15 AM    PLATELET 366 00/15/4778 03:15 AM    MCV 87.6 02/14/2017 03:15 AM      Lab Results   Component Value Date/Time    Sodium 140 02/14/2017 03:15 AM    Potassium 4.1 02/14/2017 03:15 AM    Chloride 105 02/14/2017 03:15 AM    CO2 26 02/14/2017 03:15 AM    Anion gap 9 02/14/2017 03:15 AM    Glucose 130 (H) 02/14/2017 03:15 AM    BUN 12 02/14/2017 03:15 AM    Creatinine 1.48 (H) 02/14/2017 03:15 AM    BUN/Creatinine ratio 8 (L) 02/14/2017 03:15 AM    GFR est AA 58 (L) 02/14/2017 03:15 AM    GFR est non-AA 48 (L) 02/14/2017 03:15 AM    Calcium 8.1 (L) 02/14/2017 03:15 AM    Bilirubin, total 0.6 01/18/2019 10:52 AM    Alk. phosphatase 73 01/18/2019 10:52 AM    Protein, total 7.0 01/18/2019 10:52 AM    Albumin 4.7 01/18/2019 10:52 AM    Globulin 3.2 02/06/2017 10:50 AM    A-G Ratio 1.3 02/06/2017 10:50 AM    ALT (SGPT) 24 01/18/2019 10:52 AM           ASSESSMENT/RECOMMENDATIONS:.      1. CAD s/p stents (C/P)  -Last stenting was done about 13 years ago  -He says he is not riding his bike as much but encouraged him to continue to exercise regularly    2. HTN  - BP excellent on current medical regimen of enalapril 2.5 mg a day    3.  Dyslipidemia  -LDL goal is under 70.  -His last LDL was in November 2021 when he was not able to get his simvastatin and went up to 109 where it typically is in the 60s to 70s. -We will recheck his cholesterol    With me in 6 months or as needed with an echocardiogram secondary to his CAD. No orders of the defined types were placed in this encounter. Follow-up and Dispositions  ·   Return in about 6 months (around 10/19/2022). I have discussed the diagnosis with  Jono Sagastume and the intended plan as seen in the above orders. Questions were answered concerning future plans. I have discussed medication side effects and warnings with the patient as well. Thank you,  José Manuel Dean MD for involving me in the care of  Jono Sagastume. Please do not hesitate to contact me for further questions/concerns. Kayden Doan MD, [facility] [address]      [address]      [phone] / [phone] Fax

## 2022-04-18 NOTE — PATIENT INSTRUCTIONS
1) return in 6 months with an echo      It is  my pleasure to have the opportunity to be involved in taking care of you and to provide you the best cardiac care. At the end of every visit I  encourage you to eat healthy and clean and reduce your red meat intake as well as exercise 30 minutes 5 days a week to ensure a healthy heart. If you are a smoker , it will be essential that you stop smoking to reduce your risk of heart disease. Part of providing you the best heart care possible IS AN ACCURATE KNOWLEDGE OF YOUR MEDICINE. It  will be  essential at each office visit that you provide me with an accurate list of your medicines. When you come into the office you should have that list or another option is lining up your pill bottles  and taking a snapshot with your cell phone of all the medicines you are taking currently and show it to the nurse in the examining room. Inaccurate reporting of your medication to the nurse may lead to adverse events and medical errors. Thank you again for giving me the opportunity to be part of your heart care and it is my pleasure. All the best,    Kayden Simno MD

## 2022-04-19 ENCOUNTER — OFFICE VISIT (OUTPATIENT)
Dept: CARDIOLOGY CLINIC | Age: 72
End: 2022-04-19
Payer: MEDICARE

## 2022-04-19 VITALS
SYSTOLIC BLOOD PRESSURE: 120 MMHG | HEIGHT: 69 IN | WEIGHT: 190 LBS | OXYGEN SATURATION: 99 % | DIASTOLIC BLOOD PRESSURE: 72 MMHG | RESPIRATION RATE: 18 BRPM | HEART RATE: 61 BPM | BODY MASS INDEX: 28.14 KG/M2

## 2022-04-19 DIAGNOSIS — Z95.5 S/P CORONARY ARTERY STENT PLACEMENT: Primary | ICD-10-CM

## 2022-04-19 DIAGNOSIS — I10 ESSENTIAL HYPERTENSION: ICD-10-CM

## 2022-04-19 DIAGNOSIS — E78.5 DYSLIPIDEMIA: ICD-10-CM

## 2022-04-19 PROCEDURE — 3017F COLORECTAL CA SCREEN DOC REV: CPT | Performed by: SPECIALIST

## 2022-04-19 PROCEDURE — G0463 HOSPITAL OUTPT CLINIC VISIT: HCPCS | Performed by: SPECIALIST

## 2022-04-19 PROCEDURE — G8419 CALC BMI OUT NRM PARAM NOF/U: HCPCS | Performed by: SPECIALIST

## 2022-04-19 PROCEDURE — 1101F PT FALLS ASSESS-DOCD LE1/YR: CPT | Performed by: SPECIALIST

## 2022-04-19 PROCEDURE — G8536 NO DOC ELDER MAL SCRN: HCPCS | Performed by: SPECIALIST

## 2022-04-19 PROCEDURE — G8752 SYS BP LESS 140: HCPCS | Performed by: SPECIALIST

## 2022-04-19 PROCEDURE — G8510 SCR DEP NEG, NO PLAN REQD: HCPCS | Performed by: SPECIALIST

## 2022-04-19 PROCEDURE — G8427 DOCREV CUR MEDS BY ELIG CLIN: HCPCS | Performed by: SPECIALIST

## 2022-04-19 PROCEDURE — G8754 DIAS BP LESS 90: HCPCS | Performed by: SPECIALIST

## 2022-04-19 PROCEDURE — 99214 OFFICE O/P EST MOD 30 MIN: CPT | Performed by: SPECIALIST

## 2022-04-19 NOTE — PROGRESS NOTES
Visit Vitals  /72   Pulse 61   Resp 18   Ht 5' 9\" (1.753 m)   Wt 190 lb (86.2 kg)   SpO2 99%   BMI 28.06 kg/m²

## 2022-04-19 NOTE — LETTER
4/19/2022    Patient: Julieth Cruz   YOB: 1950   Date of Visit: 4/19/2022     Sarah Matias MD  [place] [address]  Via Fax: [phone]    Dear Sarah Matias MD,      Thank you for referring Mr. Sukhdev Arroyo to [address] [place] [address] for evaluation. My notes for this consultation are attached. If you have questions, please do not hesitate to call me. I look forward to following your patient along with you.       Sincerely,    Brenda Glez MD

## 2022-06-18 LAB
ALBUMIN SERPL-MCNC: 4.6 G/DL (ref 3.7–4.7)
ALP SERPL-CCNC: 93 IU/L (ref 44–121)
ALT SERPL-CCNC: 25 IU/L (ref 0–44)
AST SERPL-CCNC: 19 IU/L (ref 0–40)
BILIRUB DIRECT SERPL-MCNC: 0.16 MG/DL (ref 0–0.4)
BILIRUB SERPL-MCNC: 0.6 MG/DL (ref 0–1.2)
CHOLEST SERPL-MCNC: 118 MG/DL (ref 100–199)
HDLC SERPL-MCNC: 34 MG/DL
IMP & REVIEW OF LAB RESULTS: NORMAL
LDLC SERPL CALC-MCNC: 67 MG/DL (ref 0–99)
PROT SERPL-MCNC: 7.3 G/DL (ref 6–8.5)
TRIGL SERPL-MCNC: 83 MG/DL (ref 0–149)
VLDLC SERPL CALC-MCNC: 17 MG/DL (ref 5–40)

## 2022-06-22 DIAGNOSIS — E78.5 DYSLIPIDEMIA: Primary | ICD-10-CM

## 2022-09-08 RX ORDER — NITROGLYCERIN 0.4 MG/1
0.4 TABLET SUBLINGUAL
Qty: 25 TABLET | Refills: 3 | Status: SHIPPED | OUTPATIENT
Start: 2022-09-08

## 2022-09-08 NOTE — TELEPHONE ENCOUNTER
Refill per VO of Dr. Noy Tate:    Last appt: Visit date not found    Future Appointments   Date Time Provider Delilah Brea   10/25/2022 12:30 PM BLAYNE BNOE   10/25/2022  2:40 PM Yosef Doan MD [facility]       Requested Prescriptions     Pending Prescriptions Disp Refills    nitroglycerin (NITROSTAT) 0.4 mg SL tablet 25 Tablet 0     Sig: Up to 3 doses.

## 2022-10-24 NOTE — PROGRESS NOTES
CARDIOLOGY OFFICE NOTE    Kayden Tijerina MD, [address], [place], [address] [facility]  Phone [phone]; Fax [phone]  Mobile [phone]   Voice Mail [phone]         ATTENTION:   This medical record was transcribed using an electronic medical records/speech recognition system. Although proofread, it may and can contain electronic, spelling and other errors. Corrections may be executed at a later time. Please feel free to contact us for any clarifications as needed. ICD-10-CM ICD-9-CM   1. Coronary artery disease involving native coronary artery of native heart without angina pectoris  I25.10 414.01   2. S/P coronary artery stent placement  Z95.5 V45.82   3. Essential hypertension  I10 401.9   4. Dyslipidemia  E78.5 272.4            Sisto Duane is a 67 y.o. male with  referred for   chest discomfort history of coronary artery disease hypertension and dyslipidemia. .     I have personally obtained the history from the patient. HISTORY OF PRESENTING ILLNESS      Mr. Gurinder Fang is doing well. He denies any chest pain or shortness of breath. He remains active but is not walking should be. His last was unrelieved and 2009. He had LAD lesion. I did show him images of his echo today and he does have apical hypokinesis. I showed him all the images.      ACTIVE PROBLEM LIST     Patient Active Problem List    Diagnosis Date Noted    Dyslipidemia 04/01/2020    S/P coronary artery stent placement 04/01/2020    Prostate cancer ([facility] [address] 02/13/2017    Coronary artery disease involving native coronary artery of native heart 10/15/2010    Essential hypertension 10/15/2010    Pure hypercholesterolemia 10/15/2010           PAST MEDICAL HISTORY     Past Medical History:   Diagnosis Date    CAD (coronary artery disease) 1999, 2009    hx of coronary stent- singe vessel- LAD    Cancer ([address]     prostate    Essential hypertension     GERD (gastroesophageal reflux disease)     Hyperlipidemia     Sleep apnea     CPAP complaint           PAST SURGICAL HISTORY     Past Surgical History:   Procedure Laterality Date    HX CATARACT REMOVAL Bilateral     HX HEART CATHETERIZATION  , & 2009    LAD    HX PTCA      Stent of the LAD in  and again in  with a 3 x 28 mm on 09 post dilated to 3.25    HX UROLOGICAL  10/2016    prostate biopsy          ALLERGIES     No Known Allergies       FAMILY HISTORY     Family History   Problem Relation Age of Onset    Hypertension Mother     Hypertension Father     Heart Disease Father 79        MI    Hypertension Sister     Hypertension Brother     Hypertension Brother     negative for cardiac disease       SOCIAL HISTORY     Social History     Socioeconomic History    Marital status:    Tobacco Use    Smoking status: Former     Packs/day: 1.00     Years: 30.00     Pack years: 30.00     Types: Cigarettes     Quit date:      Years since quittin.8    Smokeless tobacco: Never   Substance and Sexual Activity    Alcohol use: No    Drug use: No     Comment: none since - hx of casual use marijuana and cocaine    Sexual activity: Not Currently         MEDICATIONS     Current Outpatient Medications   Medication Sig    nitroglycerin (NITROSTAT) 0.4 mg SL tablet 1 Tablet by SubLINGual route every five (5) minutes as needed for Chest Pain. Up to 3 doses. enalapril (VASOTEC) 2.5 mg tablet TAKE 1 TABLET DAILY    simvastatin (ZOCOR) 20 mg tablet TAKE 1 TABLET NIGHTLY    multivitamin (ONE A DAY) tablet Take 1 Tab by mouth daily. pantoprazole (PROTONIX) 40 mg tablet Take 40 mg by mouth daily (with breakfast). Patient takes with food, breakfast    aspirin 81 mg tablet Take 81 mg by mouth daily. Indications: stroke prevention     No current facility-administered medications for this visit.        I have reviewed the nurses notes, vitals, problem list, allergy list, medical history, family, social history and medications. REVIEW OF SYMPTOMS   Per HPI  General: Pt denies excessive weight gain or loss. Pt is able to conduct ADL's  HEENT: Denies blurred vision, headaches, hearing loss, epistaxis and difficulty swallowing. Respiratory: Denies cough, congestion, shortness of breath, DELONG, wheezing or stridor. Cardiovascular: Denies precordial pain, palpitations, edema or PND  Gastrointestinal: Denies poor appetite, indigestion, abdominal pain or blood in stool  Genitourinary: Denies hematuria, dysuria, increased urinary frequency  Musculoskeletal: Denies joint pain or swelling from muscles or joints  Neurologic: Denies tremor, paresthesias, headache, or sensory motor disturbance  Psychiatric: Denies confusion, insomnia, depression  Integumentray: Denies rash, itching or ulcers. Hematologic: Denies easy bruising, bleeding     PHYSICAL EXAMINATION      Vitals:    10/25/22 1318   BP: 126/60   Pulse: 64   SpO2: 100%   Weight: 190 lb 6.4 oz (86.4 kg)   Height: 5' 9\" (1.753 m)       General: Well developed, in no acute distress. HEENT: No jaundice, oral mucosa moist, no oral ulcers  Neck: Supple, no stiffness, no lymphadenopathy, supple  Heart:  Normal S1/S2 negative S3 or S4. Regular, no murmur, gallop or rub, no jugular venous distention  Respiratory: Clear bilaterally x 4, no wheezing or rales  Extremities:  No edema, normal cap refill, no cyanosis. Musculoskeletal: No clubbing, no deformities  Neuro: A&Ox3, speech clear, gait stable, cooperative, no focal neurologic deficits  Skin: Skin color is normal. No rashes or lesions. Non diaphoretic, moist.         DIAGNOSTIC DATA     1. Echo   4/5/06 - EF 55%, RVSP 31 mmHg     2. Myocardial perfusion study   12/3/09 - Abnormal myocardial perfusion of the LAD territory with EF 55%   (2/6/18)(stress)(7.0 METS)(5:01) LVEF 66%   (Normal study)   (2/6/18) exercise Cardiolite that was normal with EF 66%. 5/19/20- Lexiscan- Abnormal myocardial perfusion imaging.  Fixed defect consistent with prior myocardial infarction     3. Cardiac catheterization   12/21/09 - Normal hemodynamics, Single vessel coronary artery disease of the LAD, EF 50% with regional wall motion abnormalities. LAD (99% in stent restenosis from 1999),RCA(insig),LCX(insig)     4. Lipids   5/15/19- , HDL 32, LDL 67,    11/17/20- , HDL 37, LDL 70, TG 93   12/8/20- , HDL 36, LDL 73,   6/4/21- , HDL 36, LDL 76, TG 82  11/30/21- , HDL 34, ,   6/17/22- , HDL 34, LDL 67, TG 83           LABORATORY DATA            Lab Results   Component Value Date/Time    WBC 11.3 (H) 02/14/2017 03:15 AM    HGB 13.7 02/14/2017 03:15 AM    HCT 41.8 02/14/2017 03:15 AM    PLATELET 210 42/36/0141 03:15 AM    MCV 87.6 02/14/2017 03:15 AM      Lab Results   Component Value Date/Time    Sodium 140 02/14/2017 03:15 AM    Potassium 4.1 02/14/2017 03:15 AM    Chloride 105 02/14/2017 03:15 AM    CO2 26 02/14/2017 03:15 AM    Anion gap 9 02/14/2017 03:15 AM    Glucose 130 (H) 02/14/2017 03:15 AM    BUN 12 02/14/2017 03:15 AM    Creatinine 1.48 (H) 02/14/2017 03:15 AM    BUN/Creatinine ratio 8 (L) 02/14/2017 03:15 AM    GFR est AA 58 (L) 02/14/2017 03:15 AM    GFR est non-AA 48 (L) 02/14/2017 03:15 AM    Calcium 8.1 (L) 02/14/2017 03:15 AM    Bilirubin, total 0.6 06/17/2022 09:30 AM    Alk. phosphatase 93 06/17/2022 09:30 AM    Protein, total 7.3 06/17/2022 09:30 AM    Albumin 4.6 06/17/2022 09:30 AM    Globulin 3.2 02/06/2017 10:50 AM    A-G Ratio 1.3 02/06/2017 10:50 AM    ALT (SGPT) 25 06/17/2022 09:30 AM           ASSESSMENT/RECOMMENDATIONS:.      1. CAD s/p stents (C/P)  -Last stenting was done about 13 years ago  -No chest pain shortness of breath no cardiac testing indicated  -Echocardiogram today revealed near normal EF around 55% and the apex of his heart was hypokinetic    2. HTN  - BP is at goal    3.  Dyslipidemia  -LDL was 67 at goal    With me in 6 months     No orders of the defined types were placed in this encounter. Follow-up and Dispositions    Return in about 6 months (around 4/25/2023). I have discussed the diagnosis with  Danielle Child and the intended plan as seen in the above orders. Questions were answered concerning future plans. I have discussed medication side effects and warnings with the patient as well. Thank you,  Marcelo Daley MD for involving me in the care of  Danielle Child. Please do not hesitate to contact me for further questions/concerns. Kayden Doan MD, [facility], [facility] [address]      [address] [place] [address]      [phone] / [phone] Fax

## 2022-10-24 NOTE — PATIENT INSTRUCTIONS

## 2022-10-25 ENCOUNTER — ANCILLARY PROCEDURE (OUTPATIENT)
Dept: CARDIOLOGY CLINIC | Age: 72
End: 2022-10-25
Payer: MEDICARE

## 2022-10-25 ENCOUNTER — OFFICE VISIT (OUTPATIENT)
Dept: CARDIOLOGY CLINIC | Age: 72
End: 2022-10-25
Payer: MEDICARE

## 2022-10-25 VITALS
BODY MASS INDEX: 28.2 KG/M2 | HEIGHT: 69 IN | SYSTOLIC BLOOD PRESSURE: 126 MMHG | DIASTOLIC BLOOD PRESSURE: 60 MMHG | WEIGHT: 190.4 LBS

## 2022-10-25 VITALS
BODY MASS INDEX: 28.2 KG/M2 | HEIGHT: 69 IN | SYSTOLIC BLOOD PRESSURE: 126 MMHG | DIASTOLIC BLOOD PRESSURE: 60 MMHG | OXYGEN SATURATION: 100 % | WEIGHT: 190.4 LBS | HEART RATE: 64 BPM

## 2022-10-25 DIAGNOSIS — I10 ESSENTIAL HYPERTENSION: ICD-10-CM

## 2022-10-25 DIAGNOSIS — I25.10 CORONARY ARTERY DISEASE INVOLVING NATIVE CORONARY ARTERY OF NATIVE HEART WITHOUT ANGINA PECTORIS: Primary | ICD-10-CM

## 2022-10-25 DIAGNOSIS — Z95.5 S/P CORONARY ARTERY STENT PLACEMENT: ICD-10-CM

## 2022-10-25 DIAGNOSIS — E78.5 DYSLIPIDEMIA: ICD-10-CM

## 2022-10-25 LAB
ECHO AO ASC DIAM: 3.1 CM
ECHO AO ASCENDING AORTA INDEX: 1.53 CM/M2
ECHO AO ROOT DIAM: 3.6 CM
ECHO AO ROOT INDEX: 1.78 CM/M2
ECHO AV AREA PEAK VELOCITY: 3.2 CM2
ECHO AV AREA VTI: 3.4 CM2
ECHO AV AREA/BSA PEAK VELOCITY: 1.6 CM2/M2
ECHO AV AREA/BSA VTI: 1.7 CM2/M2
ECHO AV MEAN GRADIENT: 2 MMHG
ECHO AV MEAN VELOCITY: 0.6 M/S
ECHO AV PEAK GRADIENT: 3 MMHG
ECHO AV PEAK VELOCITY: 0.9 M/S
ECHO AV VELOCITY RATIO: 0.89
ECHO AV VTI: 18.4 CM
ECHO EST RA PRESSURE: 3 MMHG
ECHO LA DIAMETER INDEX: 2.03 CM/M2
ECHO LA DIAMETER: 4.1 CM
ECHO LA TO AORTIC ROOT RATIO: 1.14
ECHO LA VOL 2C: 50 ML (ref 18–58)
ECHO LA VOL 4C: 50 ML (ref 18–58)
ECHO LA VOLUME AREA LENGTH: 55 ML
ECHO LA VOLUME INDEX A2C: 25 ML/M2 (ref 16–34)
ECHO LA VOLUME INDEX A4C: 25 ML/M2 (ref 16–34)
ECHO LA VOLUME INDEX AREA LENGTH: 27 ML/M2 (ref 16–34)
ECHO LV E' LATERAL VELOCITY: 10 CM/S
ECHO LV E' SEPTAL VELOCITY: 7 CM/S
ECHO LV EDV A2C: 97 ML
ECHO LV EDV A4C: 103 ML
ECHO LV EDV BP: 100 ML (ref 67–155)
ECHO LV EDV INDEX A4C: 51 ML/M2
ECHO LV EDV INDEX BP: 50 ML/M2
ECHO LV EDV NDEX A2C: 48 ML/M2
ECHO LV EJECTION FRACTION A2C: 49 %
ECHO LV EJECTION FRACTION A4C: 62 %
ECHO LV EJECTION FRACTION BIPLANE: 56 % (ref 55–100)
ECHO LV ESV A2C: 50 ML
ECHO LV ESV A4C: 39 ML
ECHO LV ESV BP: 44 ML (ref 22–58)
ECHO LV ESV INDEX A2C: 25 ML/M2
ECHO LV ESV INDEX A4C: 19 ML/M2
ECHO LV ESV INDEX BP: 22 ML/M2
ECHO LV FRACTIONAL SHORTENING: 28 % (ref 28–44)
ECHO LV INTERNAL DIMENSION DIASTOLE INDEX: 2.33 CM/M2
ECHO LV INTERNAL DIMENSION DIASTOLIC: 4.7 CM (ref 4.2–5.9)
ECHO LV INTERNAL DIMENSION SYSTOLIC INDEX: 1.68 CM/M2
ECHO LV INTERNAL DIMENSION SYSTOLIC: 3.4 CM
ECHO LV IVSD: 0.9 CM (ref 0.6–1)
ECHO LV MASS 2D: 142.7 G (ref 88–224)
ECHO LV MASS INDEX 2D: 70.6 G/M2 (ref 49–115)
ECHO LV POSTERIOR WALL DIASTOLIC: 0.9 CM (ref 0.6–1)
ECHO LV RELATIVE WALL THICKNESS RATIO: 0.38
ECHO LVOT AREA: 3.8 CM2
ECHO LVOT AV VTI INDEX: 0.89
ECHO LVOT DIAM: 2.2 CM
ECHO LVOT MEAN GRADIENT: 1 MMHG
ECHO LVOT PEAK GRADIENT: 2 MMHG
ECHO LVOT PEAK VELOCITY: 0.8 M/S
ECHO LVOT STROKE VOLUME INDEX: 30.7 ML/M2
ECHO LVOT SV: 61.9 ML
ECHO LVOT VTI: 16.3 CM
ECHO MV A VELOCITY: 0.6 M/S
ECHO MV AREA PHT: 5.8 CM2
ECHO MV AREA VTI: 3.6 CM2
ECHO MV E DECELERATION TIME (DT): 131.1 MS
ECHO MV E VELOCITY: 0.8 M/S
ECHO MV E/A RATIO: 1.33
ECHO MV E/E' LATERAL: 8
ECHO MV E/E' RATIO (AVERAGED): 9.71
ECHO MV E/E' SEPTAL: 11.43
ECHO MV LVOT VTI INDEX: 1.07
ECHO MV MAX VELOCITY: 0.8 M/S
ECHO MV MEAN GRADIENT: 1 MMHG
ECHO MV MEAN VELOCITY: 0.4 M/S
ECHO MV PEAK GRADIENT: 2 MMHG
ECHO MV PRESSURE HALF TIME (PHT): 38 MS
ECHO MV VTI: 17.4 CM
ECHO RIGHT VENTRICULAR SYSTOLIC PRESSURE (RVSP): 32 MMHG
ECHO RV FREE WALL PEAK S': 10 CM/S
ECHO RV INTERNAL DIMENSION: 3.7 CM
ECHO RV TAPSE: 2.1 CM (ref 1.7–?)
ECHO TV REGURGITANT MAX VELOCITY: 2.71 M/S
ECHO TV REGURGITANT PEAK GRADIENT: 29 MMHG

## 2022-10-25 PROCEDURE — 3017F COLORECTAL CA SCREEN DOC REV: CPT | Performed by: SPECIALIST

## 2022-10-25 PROCEDURE — 99214 OFFICE O/P EST MOD 30 MIN: CPT | Performed by: SPECIALIST

## 2022-10-25 PROCEDURE — G8536 NO DOC ELDER MAL SCRN: HCPCS | Performed by: SPECIALIST

## 2022-10-25 PROCEDURE — G8417 CALC BMI ABV UP PARAM F/U: HCPCS | Performed by: SPECIALIST

## 2022-10-25 PROCEDURE — 1123F ACP DISCUSS/DSCN MKR DOCD: CPT | Performed by: SPECIALIST

## 2022-10-25 PROCEDURE — G0463 HOSPITAL OUTPT CLINIC VISIT: HCPCS | Performed by: SPECIALIST

## 2022-10-25 PROCEDURE — G8427 DOCREV CUR MEDS BY ELIG CLIN: HCPCS | Performed by: SPECIALIST

## 2022-10-25 PROCEDURE — 93306 TTE W/DOPPLER COMPLETE: CPT | Performed by: SPECIALIST

## 2022-10-25 PROCEDURE — G8752 SYS BP LESS 140: HCPCS | Performed by: SPECIALIST

## 2022-10-25 PROCEDURE — G8510 SCR DEP NEG, NO PLAN REQD: HCPCS | Performed by: SPECIALIST

## 2022-10-25 PROCEDURE — G8754 DIAS BP LESS 90: HCPCS | Performed by: SPECIALIST

## 2022-10-25 PROCEDURE — 1101F PT FALLS ASSESS-DOCD LE1/YR: CPT | Performed by: SPECIALIST

## 2022-10-25 NOTE — LETTER
10/25/2022    Patient: Latoya Mitchell   YOB: 1950   Date of Visit: 10/25/2022     Lai Arroyo MD  [place] [id number]  Via Fax: [phone]    Dear Lai Arroyo MD,      Thank you for referring Mr. Shubham Pérez to [address] for evaluation. My notes for this consultation are attached. If you have questions, please do not hesitate to call me. I look forward to following your patient along with you.       Sincerely,    Ramiro Gonzalez MD

## 2022-10-25 NOTE — PROGRESS NOTES
Chief Complaint   Patient presents with    Follow-up     6month    Hypertension    Cholesterol Problem    Coronary Artery Disease     Echo @1230       Vitals:    10/25/22 1318   BP: 126/60   Pulse: 64   Height: 5' 9\" (1.753 m)   Weight: 190 lb 6.4 oz (86.4 kg)   SpO2: 100%         Chest pain: no    SOB: no    Palpitations: no    Dizziness: no    Swelling: no    Refills:       Have you been to the ER, urgent care clinic since your last visit? Hospitalized since your last visit? no    Have you sen or consulted other health care providers outside of the [facility] since your last visit?  (Include any pap smears or colon screening.)

## 2023-04-22 LAB
ALBUMIN SERPL-MCNC: 4.5 G/DL (ref 3.7–4.7)
ALP SERPL-CCNC: 92 IU/L (ref 44–121)
ALT SERPL-CCNC: 31 IU/L (ref 0–44)
AST SERPL-CCNC: 27 IU/L (ref 0–40)
BILIRUB DIRECT SERPL-MCNC: 0.21 MG/DL (ref 0–0.4)
BILIRUB SERPL-MCNC: 0.7 MG/DL (ref 0–1.2)
CHOLEST SERPL-MCNC: 129 MG/DL (ref 100–199)
HDLC SERPL-MCNC: 35 MG/DL
LDLC SERPL CALC-MCNC: 77 MG/DL (ref 0–99)
PROT SERPL-MCNC: 7.2 G/DL (ref 6–8.5)
TRIGL SERPL-MCNC: 87 MG/DL (ref 0–149)
VLDLC SERPL CALC-MCNC: 17 MG/DL (ref 5–40)

## 2023-04-25 ENCOUNTER — OFFICE VISIT (OUTPATIENT)
Dept: CARDIOLOGY CLINIC | Age: 73
End: 2023-04-25
Payer: MEDICARE

## 2023-04-25 VITALS
HEART RATE: 64 BPM | BODY MASS INDEX: 29.03 KG/M2 | OXYGEN SATURATION: 98 % | DIASTOLIC BLOOD PRESSURE: 60 MMHG | HEIGHT: 69 IN | SYSTOLIC BLOOD PRESSURE: 120 MMHG | WEIGHT: 196 LBS

## 2023-04-25 DIAGNOSIS — Z95.5 S/P CORONARY ARTERY STENT PLACEMENT: Primary | ICD-10-CM

## 2023-04-25 DIAGNOSIS — E78.5 DYSLIPIDEMIA: Primary | ICD-10-CM

## 2023-04-25 DIAGNOSIS — E78.5 DYSLIPIDEMIA: ICD-10-CM

## 2023-04-25 DIAGNOSIS — I10 ESSENTIAL HYPERTENSION: ICD-10-CM

## 2023-04-25 PROCEDURE — 1123F ACP DISCUSS/DSCN MKR DOCD: CPT | Performed by: SPECIALIST

## 2023-04-25 PROCEDURE — G8536 NO DOC ELDER MAL SCRN: HCPCS | Performed by: SPECIALIST

## 2023-04-25 PROCEDURE — 1101F PT FALLS ASSESS-DOCD LE1/YR: CPT | Performed by: SPECIALIST

## 2023-04-25 PROCEDURE — G8427 DOCREV CUR MEDS BY ELIG CLIN: HCPCS | Performed by: SPECIALIST

## 2023-04-25 PROCEDURE — G8432 DEP SCR NOT DOC, RNG: HCPCS | Performed by: SPECIALIST

## 2023-04-25 PROCEDURE — G0463 HOSPITAL OUTPT CLINIC VISIT: HCPCS | Performed by: SPECIALIST

## 2023-04-25 PROCEDURE — G8417 CALC BMI ABV UP PARAM F/U: HCPCS | Performed by: SPECIALIST

## 2023-04-25 PROCEDURE — 3074F SYST BP LT 130 MM HG: CPT | Performed by: SPECIALIST

## 2023-04-25 PROCEDURE — 99214 OFFICE O/P EST MOD 30 MIN: CPT | Performed by: SPECIALIST

## 2023-04-25 PROCEDURE — 3017F COLORECTAL CA SCREEN DOC REV: CPT | Performed by: SPECIALIST

## 2023-04-25 PROCEDURE — 3078F DIAST BP <80 MM HG: CPT | Performed by: SPECIALIST

## 2023-04-25 RX ORDER — SODIUM BICARBONATE 650 MG/1
650 TABLET ORAL 2 TIMES DAILY
COMMUNITY

## 2023-04-25 NOTE — PROGRESS NOTES
CARDIOLOGY OFFICE NOTE    Kayden Moreau MD, [address], [place], [address] [facility]  Phone [phone]; Fax [phone]  Mobile [phone]   Voice Mail [phone]         ATTENTION:   This medical record was transcribed using an electronic medical records/speech recognition system. Although proofread, it may and can contain electronic, spelling and other errors. Corrections may be executed at a later time. Please feel free to contact us for any clarifications as needed. ICD-10-CM ICD-9-CM   1. S/P coronary artery stent placement  Z95.5 V45.82   2. Essential hypertension  I10 401.9   3. Dyslipidemia  E78.5 272.4            Cece Sanchez is a 68 y.o. male with  referred for   chest discomfort history of coronary artery disease hypertension and dyslipidemia. .     I have personally obtained the history from the patient. HISTORY OF PRESENTING ILLNESS      Mr. Zayra Price has no interval cardiac complaints. He states he is riding a bike now and remains active. We reviewed his cholesterol profile and his blood pressure is excellent today.      ACTIVE PROBLEM LIST     Patient Active Problem List    Diagnosis Date Noted    Dyslipidemia 04/01/2020    S/P coronary artery stent placement 04/01/2020    Prostate cancer ([address] 02/13/2017    Coronary artery disease involving native coronary artery of native heart 10/15/2010    Essential hypertension 10/15/2010    Pure hypercholesterolemia 10/15/2010           PAST MEDICAL HISTORY     Past Medical History:   Diagnosis Date    CAD (coronary artery disease) 1999, 2009    hx of coronary stent- singe vessel- LAD    Cancer ([address]     prostate    Essential hypertension     GERD (gastroesophageal reflux disease)     Hyperlipidemia     Sleep apnea     CPAP complaint           PAST SURGICAL HISTORY     Past Surgical History:   Procedure Laterality Date    HX CATARACT REMOVAL Bilateral     HX HEART CATHETERIZATION  1999, & 2009    LAD    HX PTCA      Stent of the LAD in  and again in  with a 3 x 28 mm on 09 post dilated to 3.25    HX UROLOGICAL  10/2016    prostate biopsy          ALLERGIES     No Known Allergies       FAMILY HISTORY     Family History   Problem Relation Age of Onset    Hypertension Mother     Hypertension Father     Heart Disease Father 79        MI    Hypertension Sister     Hypertension Brother     Hypertension Brother     negative for cardiac disease       SOCIAL HISTORY     Social History     Socioeconomic History    Marital status:    Tobacco Use    Smoking status: Former     Packs/day: 1.00     Years: 30.00     Pack years: 30.00     Types: Cigarettes     Quit date:      Years since quittin.3    Smokeless tobacco: Never   Substance and Sexual Activity    Alcohol use: No    Drug use: No     Comment: none since - hx of casual use marijuana and cocaine    Sexual activity: Not Currently         MEDICATIONS     Current Outpatient Medications   Medication Sig    sodium bicarbonate 650 mg tablet Take 1 Tablet by mouth two (2) times a day. cholecalciferol, vitamin D3, (VITAMIN D3 PO) Take 1 Capsule by mouth daily. nitroglycerin (NITROSTAT) 0.4 mg SL tablet 1 Tablet by SubLINGual route every five (5) minutes as needed for Chest Pain. Up to 3 doses. enalapril (VASOTEC) 2.5 mg tablet TAKE 1 TABLET DAILY    simvastatin (ZOCOR) 20 mg tablet TAKE 1 TABLET NIGHTLY    multivitamin (ONE A DAY) tablet Take 1 Tablet by mouth daily. pantoprazole (PROTONIX) 40 mg tablet Take 1 Tablet by mouth daily (with breakfast). Patient takes with food, breakfast    aspirin 81 mg tablet Take 1 Tablet by mouth daily. Indications: stroke prevention     No current facility-administered medications for this visit. I have reviewed the nurses notes, vitals, problem list, allergy list, medical history, family, social history and medications.        REVIEW OF SYMPTOMS   Per HPI  General: Pt denies excessive weight gain or loss. Pt is able to conduct ADL's  HEENT: Denies blurred vision, headaches, hearing loss, epistaxis and difficulty swallowing. Respiratory: Denies cough, congestion, shortness of breath, DELONG, wheezing or stridor. Cardiovascular: Denies precordial pain, palpitations, edema or PND  Gastrointestinal: Denies poor appetite, indigestion, abdominal pain or blood in stool  Genitourinary: Denies hematuria, dysuria, increased urinary frequency  Musculoskeletal: Denies joint pain or swelling from muscles or joints  Neurologic: Denies tremor, paresthesias, headache, or sensory motor disturbance  Psychiatric: Denies confusion, insomnia, depression  Integumentray: Denies rash, itching or ulcers. Hematologic: Denies easy bruising, bleeding     PHYSICAL EXAMINATION      Vitals:    04/25/23 1408   BP: 120/60   Pulse: 64   SpO2: 98%   Weight: 196 lb (88.9 kg)   Height: 5' 9\" (1.753 m)         General: Well developed, in no acute distress. HEENT: No jaundice,  Neck: Supple, no stiffness  Heart:  Normal S1/S2 negative S3 or S4. Regular, no murmur, gallop or rub, no jugular venous distention  Respiratory: Clear bilaterally x 4, no wheezing or rales  Extremities:  No edema, normal cap refill, no cyanosis. Musculoskeletal: No clubbing, no deformities  Neuro: A&Ox3, speech clear, gait stable, cooperative, no focal neurologic deficits  Skin: Skin color is normal. No rashes or lesions. Non diaphoretic, moist.         DIAGNOSTIC DATA     1. Echo   4/5/06 - EF 55%, RVSP 31 mmHg   10/25/22- EF 56%, Mild hypokinesis of the apex, Mild annular calcification of MV    2. Myocardial perfusion study   12/3/09 - Abnormal myocardial perfusion of the LAD territory with EF 55%   (2/6/18)(stress)(7.0 METS)(5:01) LVEF 66%   (Normal study)   (2/6/18) exercise Cardiolite that was normal with EF 66%. 5/19/20- Lexiscan- Abnormal myocardial perfusion imaging. Fixed defect consistent with prior myocardial infarction     3.  Cardiac catheterization   12/21/09 - Normal hemodynamics, Single vessel coronary artery disease of the LAD, EF 50% with regional wall motion abnormalities. LAD (99% in stent restenosis from 1999),RCA(insig),LCX(insig)     4. Lipids   5/15/19- , HDL 32, LDL 67,    11/17/20- , HDL 37, LDL 70, TG 93   12/8/20- , HDL 36, LDL 73,   6/4/21- , HDL 36, LDL 76, TG 82  11/30/21- , HDL 34, ,   6/17/22- , HDL 34, LDL 67, TG 83  3/15/23- , HDL 34, LDL 71,   4/21/23- , HDL 35, LDL 77, TG 87           LABORATORY DATA            Lab Results   Component Value Date/Time    WBC 11.3 (H) 02/14/2017 03:15 AM    HGB 13.7 02/14/2017 03:15 AM    HCT 41.8 02/14/2017 03:15 AM    PLATELET 277 25/91/7056 03:15 AM    MCV 87.6 02/14/2017 03:15 AM      Lab Results   Component Value Date/Time    Sodium 140 02/14/2017 03:15 AM    Potassium 4.1 02/14/2017 03:15 AM    Chloride 105 02/14/2017 03:15 AM    CO2 26 02/14/2017 03:15 AM    Anion gap 9 02/14/2017 03:15 AM    Glucose 130 (H) 02/14/2017 03:15 AM    BUN 12 02/14/2017 03:15 AM    Creatinine 1.48 (H) 02/14/2017 03:15 AM    BUN/Creatinine ratio 8 (L) 02/14/2017 03:15 AM    GFR est AA 58 (L) 02/14/2017 03:15 AM    GFR est non-AA 48 (L) 02/14/2017 03:15 AM    Calcium 8.1 (L) 02/14/2017 03:15 AM    Bilirubin, total 0.7 04/21/2023 08:59 AM    Alk. phosphatase 92 04/21/2023 08:59 AM    Protein, total 7.2 04/21/2023 08:59 AM    Albumin 4.5 04/21/2023 08:59 AM    Globulin 3.2 02/06/2017 10:50 AM    A-G Ratio 1.3 02/06/2017 10:50 AM    ALT (SGPT) 31 04/21/2023 08:59 AM           ASSESSMENT/RECOMMENDATIONS:.      1. CAD s/p stents (C/P)  -Last stenting was done about 13 years ago  -No chest pain shortness of breath no cardiac testing indicated  -Echocardiogram today revealed near normal EF around 55% and the apex of his heart was hypokinetic  -No cardiac testing indicated at this time    2.  HTN  - BP is about 138/78 when I retook it.  -Continue low-sodium diet and current medical regimen    3. Dyslipidemia  -LDL 77 on most recent test in April 2023  -Continue diet low in red meat    Follow-up in 6 months     Orders Placed This Encounter    sodium bicarbonate 650 mg tablet     Sig: Take 1 Tablet by mouth two (2) times a day. cholecalciferol, vitamin D3, (VITAMIN D3 PO)     Sig: Take 1 Capsule by mouth daily. Follow-up and Dispositions    Return in about 6 months (around 10/25/2023). I have discussed the diagnosis with  eCce Sanchez and the intended plan as seen in the above orders. Questions were answered concerning future plans. I have discussed medication side effects and warnings with the patient as well. Thank you,  Anette Duran MD for involving me in the care of  Cece Sanchez. Please do not hesitate to contact me for further questions/concerns. Kayden Doan MD, [address] [place] [address] Rd[address]      [facility], [address] [place] [address] Drive      [phone] / [phone] Fax

## 2023-04-25 NOTE — PROGRESS NOTES
Ramesh Nielson is a 68 y.o. male    Chief Complaint   Patient presents with    Follow-up    Coronary Artery Disease    Hypertension    Cholesterol Problem       Vitals:    04/25/23 1408   BP: 120/60   BP 1 Location: Left arm   BP Patient Position: Sitting   Pulse: 64   Height: 5' 9\" (1.753 m)   Weight: 196 lb (88.9 kg)   SpO2: 98%       Chest pain no    SOB no    Dizziness no    Swelling no    Refills no      1. Have you been to the ER, urgent care clinic since your last visit? Hospitalized since your last visit? No    2. Have you seen or consulted any other health care providers outside of the [address] [place] [address] since your last visit? Include any pap smears or colon screening.  No

## 2023-04-25 NOTE — PATIENT INSTRUCTIONS

## 2023-05-03 ENCOUNTER — TELEPHONE (OUTPATIENT)
Dept: CARDIOLOGY CLINIC | Age: 73
End: 2023-05-03

## 2023-05-08 DIAGNOSIS — E78.5 DYSLIPIDEMIA: Primary | ICD-10-CM

## 2023-10-25 LAB
ALBUMIN SERPL-MCNC: 4.6 G/DL (ref 3.8–4.8)
ALP SERPL-CCNC: 84 IU/L (ref 44–121)
ALT SERPL-CCNC: 27 IU/L (ref 0–44)
AST SERPL-CCNC: 22 IU/L (ref 0–40)
BILIRUB DIRECT SERPL-MCNC: 0.24 MG/DL (ref 0–0.4)
BILIRUB SERPL-MCNC: 0.7 MG/DL (ref 0–1.2)
CHOLEST SERPL-MCNC: 110 MG/DL (ref 100–199)
HDLC SERPL-MCNC: 31 MG/DL
IMP & REVIEW OF LAB RESULTS: NORMAL
LDLC SERPL CALC-MCNC: 61 MG/DL (ref 0–99)
PROT SERPL-MCNC: 6.9 G/DL (ref 6–8.5)
TRIGL SERPL-MCNC: 92 MG/DL (ref 0–149)
VLDLC SERPL CALC-MCNC: 18 MG/DL (ref 5–40)

## 2023-10-26 ENCOUNTER — OFFICE VISIT (OUTPATIENT)
Age: 73
End: 2023-10-26
Payer: MEDICARE

## 2023-10-26 VITALS
BODY MASS INDEX: 28.58 KG/M2 | SYSTOLIC BLOOD PRESSURE: 122 MMHG | DIASTOLIC BLOOD PRESSURE: 72 MMHG | WEIGHT: 193 LBS | HEIGHT: 69 IN | HEART RATE: 58 BPM | OXYGEN SATURATION: 98 %

## 2023-10-26 DIAGNOSIS — Z95.5 PRESENCE OF CORONARY ANGIOPLASTY IMPLANT AND GRAFT: ICD-10-CM

## 2023-10-26 DIAGNOSIS — Z95.5 PRESENCE OF CORONARY ANGIOPLASTY IMPLANT AND GRAFT: Primary | ICD-10-CM

## 2023-10-26 DIAGNOSIS — I10 ESSENTIAL (PRIMARY) HYPERTENSION: ICD-10-CM

## 2023-10-26 DIAGNOSIS — E78.5 HYPERLIPIDEMIA, UNSPECIFIED HYPERLIPIDEMIA TYPE: Primary | ICD-10-CM

## 2023-10-26 DIAGNOSIS — E78.5 HYPERLIPIDEMIA, UNSPECIFIED HYPERLIPIDEMIA TYPE: ICD-10-CM

## 2023-10-26 PROCEDURE — 99214 OFFICE O/P EST MOD 30 MIN: CPT | Performed by: SPECIALIST

## 2023-10-26 NOTE — PATIENT INSTRUCTIONS

## 2023-10-26 NOTE — PROGRESS NOTES
CARDIOLOGY OFFICE NOTE    Michael Guerra MD, [facility] [address], [place]  Phone [phone]; Fax [phone]  Mobile [phone]   Voice Mail [phone]    Primary care: Jozef Garrett MD       ATTENTION:   This medical record was transcribed using an electronic medical records/speech recognition system. Although proofread, it may and can contain electronic, spelling and other errors. Corrections may be executed at a later time. Please feel free to contact us for any clarifications as needed. Kyle Feng is a 68 y.o. male with  referred for chest discomfort history of coronary artery disease hypertension and dyslipidemia. .       I have personally obtained the history from the patient. Cardiac risk factors include dyslipidemia, male gender, age, hypertension    HISTORY OF PRESENTING ILLNESS    Ms./Mr. Kyel Feng  68 y.o. is is doing well. He remains active with no chest pain shortness of breath. We reviewed his cholesterol profile which is at goal.  He did has a history of stenting in 1999 and again in 2009 he had in-stent restenosis.        ACTIVE PROBLEM LIST     Patient Active Problem List    Diagnosis Date Noted    Dyslipidemia 04/01/2020    S/P coronary artery stent placement 04/01/2020    Prostate cancer ([address]) 02/13/2017    Pure hypercholesterolemia 10/15/2010    Coronary artery disease involving native coronary artery of native heart 10/15/2010    Essential hypertension 10/15/2010           PAST MEDICAL HISTORY     Past Medical History:   Diagnosis Date    CAD (coronary artery disease) 1999, 2009    hx of coronary stent- singe vessel- LAD    Cancer ([address])     prostate    Essential hypertension     GERD (gastroesophageal reflux disease)     Hyperlipidemia     Sleep apnea     CPAP complaint           PAST SURGICAL HISTORY     Past Surgical History:   Procedure Laterality Date    [address], &
myocardial perfusion imaging. Fixed defect consistent with prior myocardial infarction       3. Cardiac catheterization    12/21/09 - Normal hemodynamics, Single vessel coronary artery disease of the LAD, EF 50% with regional wall motion abnormalities. LAD (99% in stent restenosis from 1999),RCA(insig),LCX(insig)       4.  Lipids    5/15/19- , HDL 32, LDL 67,     11/17/20- , HDL 37, LDL 70, TG 93    12/8/20- , HDL 36, LDL 73,    6/4/21- , HDL 36, LDL 76, TG 82   11/30/21- , HDL 34, ,    6/17/22- , HDL 34, LDL 67, TG 83   3/15/23- , HDL 34, LDL 71,    4/21/23- , HDL 35, LDL 77, TG 87  10/24/23- , HDL 31, LDL 61, TG 92

## 2023-11-27 LAB — HBA1C MFR BLD HPLC: 13.4 %

## 2023-12-13 RX ORDER — NITROGLYCERIN 0.4 MG/1
TABLET SUBLINGUAL
Qty: 25 TABLET | Refills: 1 | Status: SHIPPED | OUTPATIENT
Start: 2023-12-13

## 2023-12-13 NOTE — TELEPHONE ENCOUNTER
Refill per VO of Dr. Bassam Adan  Last appt: 10/26/2023    Future Appointments   Date Time Provider [address]   5/7/2024  3:00 PM Peter Erickson MD [facility]       Requested Prescriptions     Pending Prescriptions Disp Refills    nitroGLYCERIN (NITROSTAT) 0.4 MG SL tablet [Pharmacy Med Name: Nitroglycerin 0.4 MG Sublingual Tablet Sublingual] 25 tablet 0     Sig: DISSOLVE ONE TABLET UNDER THE TONGUE EVERY 5 MINUTES AS NEEDED FOR CHEST PAIN.   DO NOT EXCEED A TOTAL OF 3 DOSES IN 15 MINUTES

## 2024-05-04 LAB
ALBUMIN SERPL-MCNC: 4.6 G/DL (ref 3.8–4.8)
ALP SERPL-CCNC: 79 IU/L (ref 44–121)
ALT SERPL-CCNC: 31 IU/L (ref 0–44)
AST SERPL-CCNC: 38 IU/L (ref 0–40)
BILIRUB DIRECT SERPL-MCNC: 0.14 MG/DL (ref 0–0.4)
BILIRUB SERPL-MCNC: 0.6 MG/DL (ref 0–1.2)
CHOLEST SERPL-MCNC: 121 MG/DL (ref 100–199)
HDLC SERPL-MCNC: 34 MG/DL
LDLC SERPL CALC-MCNC: 69 MG/DL (ref 0–99)
TRIGL SERPL-MCNC: 91 MG/DL (ref 0–149)
VLDLC SERPL CALC-MCNC: 18 MG/DL (ref 5–40)

## 2024-05-05 LAB — IMP & REVIEW OF LAB RESULTS: NORMAL

## 2024-05-06 NOTE — PATIENT INSTRUCTIONS

## 2024-05-07 ENCOUNTER — OFFICE VISIT (OUTPATIENT)
Age: 74
End: 2024-05-07
Payer: MEDICARE

## 2024-05-07 VITALS
BODY MASS INDEX: 28.88 KG/M2 | RESPIRATION RATE: 16 BRPM | DIASTOLIC BLOOD PRESSURE: 50 MMHG | WEIGHT: 195 LBS | OXYGEN SATURATION: 98 % | SYSTOLIC BLOOD PRESSURE: 114 MMHG | HEIGHT: 69 IN | HEART RATE: 54 BPM

## 2024-05-07 DIAGNOSIS — E78.5 HYPERLIPIDEMIA, UNSPECIFIED HYPERLIPIDEMIA TYPE: ICD-10-CM

## 2024-05-07 DIAGNOSIS — E78.5 HYPERLIPIDEMIA, UNSPECIFIED HYPERLIPIDEMIA TYPE: Primary | ICD-10-CM

## 2024-05-07 DIAGNOSIS — Z95.5 PRESENCE OF CORONARY ANGIOPLASTY IMPLANT AND GRAFT: Primary | ICD-10-CM

## 2024-05-07 DIAGNOSIS — I10 ESSENTIAL (PRIMARY) HYPERTENSION: ICD-10-CM

## 2024-05-07 PROCEDURE — 99214 OFFICE O/P EST MOD 30 MIN: CPT | Performed by: SPECIALIST

## 2024-05-07 PROCEDURE — 3074F SYST BP LT 130 MM HG: CPT | Performed by: SPECIALIST

## 2024-05-07 PROCEDURE — 1123F ACP DISCUSS/DSCN MKR DOCD: CPT | Performed by: SPECIALIST

## 2024-05-07 PROCEDURE — 93005 ELECTROCARDIOGRAM TRACING: CPT | Performed by: SPECIALIST

## 2024-05-07 PROCEDURE — 3078F DIAST BP <80 MM HG: CPT | Performed by: SPECIALIST

## 2024-05-07 PROCEDURE — 93010 ELECTROCARDIOGRAM REPORT: CPT | Performed by: SPECIALIST

## 2024-05-07 RX ORDER — AMLODIPINE BESYLATE 5 MG/1
5 TABLET ORAL DAILY
COMMUNITY

## 2024-05-07 NOTE — PROGRESS NOTES
LAD territory with EF 55%    (2/6/18)(stress)(7.0 METS)(5:01) LVEF 66%    (Normal study)    (2/6/18) exercise Cardiolite that was normal with EF 66%.    5/19/20- Lexiscan- Abnormal myocardial perfusion imaging. Fixed defect consistent with prior myocardial infarction       3. Cardiac catheterization    12/21/09 - Normal hemodynamics, Single vessel coronary artery disease of the LAD, EF 50% with regional wall motion abnormalities. LAD (99% in stent restenosis from 1999),RCA(insig),LCX(insig)       4. Lipids    12/8/20- , HDL 36, LDL 73,    6/4/21- , HDL 36, LDL 76, TG 82   11/30/21- , HDL 34, ,    6/17/22- , HDL 34, LDL 67, TG 83   3/15/23- , HDL 34, LDL 71,    4/21/23- , HDL 35, LDL 77, TG 87  10/24/23- , HDL 31, LDL 61, TG 92  11/28/23- , HDL 35, LDL 74,          
motion abnormalities. LAD (99% in stent restenosis from 1999),RCA(insig),LCX(insig)       4. Lipids    12/8/20- , HDL 36, LDL 73,    6/4/21- , HDL 36, LDL 76, TG 82   11/30/21- , HDL 34, ,    6/17/22- , HDL 34, LDL 67, TG 83   3/15/23- , HDL 34, LDL 71,    4/21/23- , HDL 35, LDL 77, TG 87  10/24/23- , HDL 31, LDL 61, TG 92  11/28/23- , HDL 35, LDL 74,               LABORATORY DATA       No results found for: \"WBC\", \"HGBPOC\", \"HGB\", \"HGBP\", \"HCTPOC\", \"HCT\", \"PHCT\", \"PLT\", \"MCV\"   Lab Results   Component Value Date/Time    ALT 31 05/03/2024 08:24 AM          ICD-10-CM    1. Presence of coronary angioplasty implant and graft  Z95.5       2. Essential (primary) hypertension  I10 EKG 12 Lead      3. Hyperlipidemia, unspecified hyperlipidemia type  E78.5               ASSESSMENT/RECOMMENDATIONS:.       1. CAD s/p stents (C/P)   -Last stenting was done about 13 years ago   -No indication for cardiac testing        2. HTN   - BP is about 114/50 but on recheck by me it was around 132/68.  Initial blood pressure was taken with a large cuff and needed the smaller cuff   -Continue low-sodium diet and current medical regimen      3. Dyslipidemia   -LDL 61 on most recent test   -Continue diet low in red meat  -Given the lab requisition have his cholesterol checked     Follow-up in 6 months    Orders Placed This Encounter   Procedures    EKG 12 Lead     Order Specific Question:   Reason for Exam?     Answer:   Hypertension       We discussed the expected course, resolution and complications of the diagnosis(es) in detail.  Medication risks, benefits, costs, interactions, and alternatives were discussed as indicated.  I advised him to contact the office if his condition worsens, changes or fails to improve as anticipated. He expressed understanding with the diagnosis(es) and plan        No follow-up provider specified.      I have discussed the diagnosis

## 2024-07-08 ENCOUNTER — HOSPITAL ENCOUNTER (EMERGENCY)
Facility: HOSPITAL | Age: 74
Discharge: HOME | End: 2024-07-08
Attending: EMERGENCY MEDICINE
Payer: MEDICARE

## 2024-07-08 VITALS
HEART RATE: 60 BPM | SYSTOLIC BLOOD PRESSURE: 140 MMHG | BODY MASS INDEX: 28.14 KG/M2 | HEIGHT: 69 IN | TEMPERATURE: 98.2 F | OXYGEN SATURATION: 100 % | WEIGHT: 190 LBS | DIASTOLIC BLOOD PRESSURE: 70 MMHG | RESPIRATION RATE: 18 BRPM

## 2024-07-08 DIAGNOSIS — R42 ORTHOSTATIC DIZZINESS: Primary | ICD-10-CM

## 2024-07-08 DIAGNOSIS — N18.30 STAGE 3 CHRONIC KIDNEY DISEASE, UNSPECIFIED WHETHER STAGE 3A OR 3B CKD (HCC): ICD-10-CM

## 2024-07-08 LAB
ALBUMIN SERPL-MCNC: 4 G/DL (ref 3.5–5)
ALBUMIN/GLOB SERPL: 1 (ref 1.1–2.2)
ALP SERPL-CCNC: 86 U/L (ref 45–117)
ALT SERPL-CCNC: 34 U/L (ref 12–78)
ANION GAP SERPL CALC-SCNC: 5 MMOL/L (ref 5–15)
APPEARANCE UR: CLEAR
AST SERPL-CCNC: 19 U/L (ref 15–37)
BACTERIA URNS QL MICRO: NEGATIVE /HPF
BASOPHILS # BLD: 0 K/UL (ref 0–0.1)
BASOPHILS NFR BLD: 1 % (ref 0–1)
BILIRUB SERPL-MCNC: 0.5 MG/DL (ref 0.2–1)
BILIRUB UR QL: NEGATIVE
BUN SERPL-MCNC: 20 MG/DL (ref 6–20)
BUN/CREAT SERPL: 11 (ref 12–20)
CALCIUM SERPL-MCNC: 9 MG/DL (ref 8.5–10.1)
CHLORIDE SERPL-SCNC: 107 MMOL/L (ref 97–108)
CO2 SERPL-SCNC: 29 MMOL/L (ref 21–32)
COLOR UR: NORMAL
CREAT SERPL-MCNC: 1.82 MG/DL (ref 0.7–1.3)
DIFFERENTIAL METHOD BLD: NORMAL
EOSINOPHIL # BLD: 0.3 K/UL (ref 0–0.4)
EOSINOPHIL NFR BLD: 5 % (ref 0–7)
EPITH CASTS URNS QL MICRO: NORMAL /LPF
ERYTHROCYTE [DISTWIDTH] IN BLOOD BY AUTOMATED COUNT: 12.9 % (ref 11.5–14.5)
GLOBULIN SER CALC-MCNC: 4.2 G/DL (ref 2–4)
GLUCOSE SERPL-MCNC: 88 MG/DL (ref 65–100)
GLUCOSE UR STRIP.AUTO-MCNC: NEGATIVE MG/DL
HCT VFR BLD AUTO: 42.6 % (ref 36.6–50.3)
HGB BLD-MCNC: 13.9 G/DL (ref 12.1–17)
HGB UR QL STRIP: NEGATIVE
HYALINE CASTS URNS QL MICRO: NORMAL /LPF (ref 0–2)
IMM GRANULOCYTES # BLD AUTO: 0 K/UL (ref 0–0.04)
IMM GRANULOCYTES NFR BLD AUTO: 0 % (ref 0–0.5)
KETONES UR QL STRIP.AUTO: NEGATIVE MG/DL
LEUKOCYTE ESTERASE UR QL STRIP.AUTO: NEGATIVE
LYMPHOCYTES # BLD: 1.8 K/UL (ref 0.8–3.5)
LYMPHOCYTES NFR BLD: 30 % (ref 12–49)
MAGNESIUM SERPL-MCNC: 2.1 MG/DL (ref 1.6–2.4)
MCH RBC QN AUTO: 29.1 PG (ref 26–34)
MCHC RBC AUTO-ENTMCNC: 32.6 G/DL (ref 30–36.5)
MCV RBC AUTO: 89.1 FL (ref 80–99)
MONOCYTES # BLD: 0.6 K/UL (ref 0–1)
MONOCYTES NFR BLD: 10 % (ref 5–13)
NEUTS SEG # BLD: 3.3 K/UL (ref 1.8–8)
NEUTS SEG NFR BLD: 54 % (ref 32–75)
NITRITE UR QL STRIP.AUTO: NEGATIVE
NRBC # BLD: 0 K/UL (ref 0–0.01)
NRBC BLD-RTO: 0 PER 100 WBC
PH UR STRIP: 5.5 (ref 5–8)
PLATELET # BLD AUTO: 334 K/UL (ref 150–400)
PMV BLD AUTO: 10 FL (ref 8.9–12.9)
POTASSIUM SERPL-SCNC: 3.8 MMOL/L (ref 3.5–5.1)
PROT SERPL-MCNC: 8.2 G/DL (ref 6.4–8.2)
PROT UR STRIP-MCNC: NEGATIVE MG/DL
RBC # BLD AUTO: 4.78 M/UL (ref 4.1–5.7)
RBC #/AREA URNS HPF: NORMAL /HPF (ref 0–5)
SODIUM SERPL-SCNC: 141 MMOL/L (ref 136–145)
SP GR UR REFRACTOMETRY: 1.02 (ref 1–1.03)
TROPONIN I SERPL HS-MCNC: 9 NG/L (ref 0–76)
UROBILINOGEN UR QL STRIP.AUTO: 0.2 EU/DL (ref 0.2–1)
WBC # BLD AUTO: 6 K/UL (ref 4.1–11.1)
WBC URNS QL MICRO: NORMAL /HPF (ref 0–4)

## 2024-07-08 PROCEDURE — 93005 ELECTROCARDIOGRAM TRACING: CPT | Performed by: EMERGENCY MEDICINE

## 2024-07-08 PROCEDURE — 80053 COMPREHEN METABOLIC PANEL: CPT

## 2024-07-08 PROCEDURE — 83735 ASSAY OF MAGNESIUM: CPT

## 2024-07-08 PROCEDURE — 96360 HYDRATION IV INFUSION INIT: CPT

## 2024-07-08 PROCEDURE — 85025 COMPLETE CBC W/AUTO DIFF WBC: CPT

## 2024-07-08 PROCEDURE — 81001 URINALYSIS AUTO W/SCOPE: CPT

## 2024-07-08 PROCEDURE — 84484 ASSAY OF TROPONIN QUANT: CPT

## 2024-07-08 PROCEDURE — 99284 EMERGENCY DEPT VISIT MOD MDM: CPT

## 2024-07-08 PROCEDURE — 2580000003 HC RX 258: Performed by: EMERGENCY MEDICINE

## 2024-07-08 PROCEDURE — 36415 COLL VENOUS BLD VENIPUNCTURE: CPT

## 2024-07-08 PROCEDURE — 96361 HYDRATE IV INFUSION ADD-ON: CPT

## 2024-07-08 RX ORDER — SODIUM CHLORIDE, SODIUM LACTATE, POTASSIUM CHLORIDE, AND CALCIUM CHLORIDE .6; .31; .03; .02 G/100ML; G/100ML; G/100ML; G/100ML
1000 INJECTION, SOLUTION INTRAVENOUS ONCE
Status: COMPLETED | OUTPATIENT
Start: 2024-07-08 | End: 2024-07-08

## 2024-07-08 RX ADMIN — SODIUM CHLORIDE, POTASSIUM CHLORIDE, SODIUM LACTATE AND CALCIUM CHLORIDE 1000 ML: 600; 310; 30; 20 INJECTION, SOLUTION INTRAVENOUS at 19:56

## 2024-07-08 ASSESSMENT — PAIN - FUNCTIONAL ASSESSMENT: PAIN_FUNCTIONAL_ASSESSMENT: NONE - DENIES PAIN

## 2024-07-08 NOTE — ED TRIAGE NOTES
EKG done prior to triage.   Pt amb to ED for c/o dizzy spells that have been happening occasionally for the last week. States PCP recommended he get evaluated in the ER.  Hx of CKD and HTN. Denies active dizziness.

## 2024-07-09 NOTE — ED NOTES
Reviewed pt's discharge paperwork with pt including follow up care, which pt is scheduled to do tomorrow.

## 2024-07-09 NOTE — ED PROVIDER NOTES
[facility] EMERGENCY DEPT  EMERGENCY DEPARTMENT ENCOUNTER      Pt Name: Lex Gonzalez  MRN: [id number]  Birthdate 1950  Date of evaluation: 7/8/2024  Provider: John Yen MD    CHIEF COMPLAINT       Chief Complaint   Patient presents with    Dizziness         HISTORY OF PRESENT ILLNESS   (Location/Symptom, Timing/Onset, Context/Setting, Quality, Duration, Modifying Factors, Severity)  Note limiting factors.   HPI  74 y.o. male presents with a chief complaint of intermittent dizziness over the past few days. He reports that these episodes of dizziness are brief, resolve spontaneously, and are not triggered by head movements. Notes worsening with standing, but lasts seconds before improving. Additionally, he denies experiencing any nausea, diarrhea, or abdominal pain associated with these episodes. The patient also reports no current dizziness, changes in hearing, or feelings of pressure in the ears. He has a medical history significant for kidney disease, for which he is under the care of a kidney specialist. Mr. Alfredo also has a history of high blood pressure, which is currently managed with medication. Previously, he was under the care of cardiologist Dr. Erickson but has been assigned a new cardiologist, whom he has yet to consult.    Review of External Medical Records:     Nursing Notes were reviewed.    REVIEW OF SYSTEMS    (2-9 systems for level 4, 10 or more for level 5)     Review of Systems    Except as noted above the remainder of the review of systems was reviewed and negative.       PAST MEDICAL HISTORY     Past Medical History:   Diagnosis Date    CAD (coronary artery disease) 1999, 2009    hx of coronary stent- singe vessel- LAD    Cancer (HCC)     prostate    Essential hypertension     GERD (gastroesophageal reflux disease)     Hyperlipidemia     Sleep apnea     CPAP complaint         SURGICAL HISTORY       Past Surgical History:   Procedure Laterality Date    CARDIAC CATHETERIZATION

## 2024-07-10 LAB
EKG ATRIAL RATE: 63 BPM
EKG DIAGNOSIS: NORMAL
EKG P AXIS: 73 DEGREES
EKG P-R INTERVAL: 168 MS
EKG Q-T INTERVAL: 390 MS
EKG QRS DURATION: 88 MS
EKG QTC CALCULATION (BAZETT): 399 MS
EKG R AXIS: 44 DEGREES
EKG T AXIS: 54 DEGREES
EKG VENTRICULAR RATE: 63 BPM

## 2024-07-10 PROCEDURE — 93010 ELECTROCARDIOGRAM REPORT: CPT | Performed by: SPECIALIST

## 2024-11-21 ENCOUNTER — OFFICE VISIT (OUTPATIENT)
Age: 74
End: 2024-11-21
Payer: MEDICARE

## 2024-11-21 VITALS
SYSTOLIC BLOOD PRESSURE: 118 MMHG | HEART RATE: 72 BPM | DIASTOLIC BLOOD PRESSURE: 78 MMHG | BODY MASS INDEX: 28.58 KG/M2 | OXYGEN SATURATION: 98 % | HEIGHT: 69 IN | WEIGHT: 193 LBS

## 2024-11-21 DIAGNOSIS — E78.5 DYSLIPIDEMIA: ICD-10-CM

## 2024-11-21 DIAGNOSIS — I10 ESSENTIAL HYPERTENSION: ICD-10-CM

## 2024-11-21 DIAGNOSIS — I25.118 CORONARY ARTERY DISEASE OF NATIVE ARTERY OF NATIVE HEART WITH STABLE ANGINA PECTORIS (HCC): Primary | ICD-10-CM

## 2024-11-21 LAB
ALBUMIN SERPL-MCNC: 4.4 G/DL (ref 3.8–4.8)
ALP SERPL-CCNC: 76 IU/L (ref 44–121)
ALT SERPL-CCNC: 25 IU/L (ref 0–44)
AST SERPL-CCNC: 21 IU/L (ref 0–40)
BILIRUB DIRECT SERPL-MCNC: 0.22 MG/DL (ref 0–0.4)
BILIRUB SERPL-MCNC: 0.6 MG/DL (ref 0–1.2)
CHOLEST SERPL-MCNC: 126 MG/DL (ref 100–199)
HDLC SERPL-MCNC: 36 MG/DL
IMP & REVIEW OF LAB RESULTS: NORMAL
LDLC SERPL CALC-MCNC: 72 MG/DL (ref 0–99)
PROT SERPL-MCNC: 7 G/DL (ref 6–8.5)
TRIGL SERPL-MCNC: 91 MG/DL (ref 0–149)
VLDLC SERPL CALC-MCNC: 18 MG/DL (ref 5–40)

## 2024-11-21 PROCEDURE — 99214 OFFICE O/P EST MOD 30 MIN: CPT | Performed by: SPECIALIST

## 2024-11-21 PROCEDURE — 1159F MED LIST DOCD IN RCRD: CPT | Performed by: SPECIALIST

## 2024-11-21 PROCEDURE — 1160F RVW MEDS BY RX/DR IN RCRD: CPT | Performed by: SPECIALIST

## 2024-11-21 PROCEDURE — 1123F ACP DISCUSS/DSCN MKR DOCD: CPT | Performed by: SPECIALIST

## 2024-11-21 PROCEDURE — 1126F AMNT PAIN NOTED NONE PRSNT: CPT | Performed by: SPECIALIST

## 2024-11-21 PROCEDURE — 3074F SYST BP LT 130 MM HG: CPT | Performed by: SPECIALIST

## 2024-11-21 PROCEDURE — 3078F DIAST BP <80 MM HG: CPT | Performed by: SPECIALIST

## 2024-11-21 RX ORDER — ROSUVASTATIN CALCIUM 10 MG/1
10 TABLET, COATED ORAL NIGHTLY
Qty: 90 TABLET | Refills: 3 | Status: SHIPPED | OUTPATIENT
Start: 2024-11-21

## 2024-11-21 NOTE — PROGRESS NOTES
Chief Complaint   Patient presents with    HLD    Hypertension     Vitals:    11/21/24 1325   BP: 118/78   Site: Left Upper Arm   Position: Sitting   Pulse: 72   SpO2: 98%   Weight: 87.5 kg (193 lb)   Height: 1.753 m (5' 9\")       Chest pain NO     ER, urgent care, or hospitalized outside of [facility] since your last visit?  NO     Refills NO   
Brother          REVIEW OF SYMPTOMS:     Review of Symptoms:  Constitutional: Negative for fever, chills  HEENT: Negative for nosebleeds, tinnitus, and vision changes.   Respiratory: Negative for cough, wheezing  Cardiovascular: Negative for orthopnea, claudication, syncope  Gastrointestinal: Negative for abdominal pain, diarrhea, melena.   Genitourinary: Negative for dysuria  Musculoskeletal: Negative for myalgias.   Skin: Negative for rash  Heme: No problems bleeding.  Neurological: Negative for speech change and focal weakness.       PHYSICAL EXAM:     Physical Exam:  /78 (Site: Left Upper Arm, Position: Sitting)   Pulse 72   Ht 1.753 m (5' 9\")   Wt 87.5 kg (193 lb)   SpO2 98%   BMI 28.50 kg/m²     Patient appears generally well, mood and affect are appropriate and pleasant.  HEENT:  Hearing intact, non-icteric, normocephalic, atraumatic.   Neck Exam: Supple, No JVD   Lung Exam: Clear to auscultation, even breath sounds.   Cardiac Exam: Regular rate and rhythm with no murmur or rub  Abdomen: Soft, non-tender  Extremities: Moves all ext well. No lower extremity edema.  MSKTL: Overall good ROM ext  Skin: No significant rashes  Psych: Appropriate affect  Neuro - Grossly intact        LABS / OTHER STUDIES:     Lab Results   Component Value Date     07/08/2024    K 3.8 07/08/2024     07/08/2024    CO2 29 07/08/2024    BUN 20 07/08/2024    CREATININE 1.82 (H) 07/08/2024    GLUCOSE 88 07/08/2024    CALCIUM 9.0 07/08/2024    BILITOT 0.6 11/20/2024    ALKPHOS 76 11/20/2024    AST 21 11/20/2024    ALT 25 11/20/2024    LABGLOM 38 (L) 07/08/2024    GLOB 4.2 (H) 07/08/2024       Lab Results   Component Value Date    CHOL 126 11/20/2024    TRIG 91 11/20/2024    HDL 36 (L) 11/20/2024    LDL 72 11/20/2024    VLDL 18 11/20/2024           CARDIAC DIAGNOSTICS:     Cardiac Evaluation Includes:  I reviewed the test results below.       Exercise Cardiolite 2/16/18 - normal MPI with EF 66%.     Lexiscan

## 2025-01-21 ENCOUNTER — TELEPHONE (OUTPATIENT)
Age: 75
End: 2025-01-21

## 2025-01-21 NOTE — TELEPHONE ENCOUNTER
Fax received from PCP including labs drawn 1/16/2025:    Chemistry:  Na 141, K 4.8, BUN 14, Cr 1.60, eGFR 45, Gluc 69, AST 26, ALT 52    Blood count:  Hgb 14.1, Hct 44.7    TSH: 1.680    Lipids: , , HDL 38, VLDL 19, LDL-c 67

## 2025-03-12 RX ORDER — NITROGLYCERIN 0.4 MG/1
0.4 TABLET SUBLINGUAL EVERY 5 MIN PRN
Qty: 25 TABLET | Refills: 1 | Status: SHIPPED | OUTPATIENT
Start: 2025-03-12

## 2025-03-12 NOTE — TELEPHONE ENCOUNTER
PCP: Wally Leo MD    Last appt: 11/21/2024   Future Appointments   Date Time Provider Department Center   5/22/2025  2:40 PM Hannah Pérez APRN - NP [facility]   11/24/2025  2:20 PM Froy Núñez MD [facility]       Requested Prescriptions     Pending Prescriptions Disp Refills    nitroGLYCERIN (NITROSTAT) 0.4 MG SL tablet 25 tablet 1     Sig: up to max of 3 total doses. If no relief after 1 dose, call 911.         Prior labs and Blood pressures:  BP Readings from Last 3 Encounters:   11/21/24 118/78   07/08/24 (!) 140/70   05/07/24 (!) 114/50     Lab Results   Component Value Date/Time     07/08/2024 07:43 PM    K 3.8 07/08/2024 07:43 PM     07/08/2024 07:43 PM    CO2 29 07/08/2024 07:43 PM    BUN 20 07/08/2024 07:43 PM     No results found for: \"HBA1C\", \"[id number]\"  Lab Results   Component Value Date/Time    CHOL 126 11/20/2024 11:23 AM    HDL 36 11/20/2024 11:23 AM    LDL 72 11/20/2024 11:23 AM    LDL 61 10/24/2023 08:14 AM    VLDL 18 11/20/2024 11:23 AM    VLDL 17 04/21/2023 08:59 AM

## 2025-05-17 LAB
ALBUMIN SERPL-MCNC: 4.6 G/DL (ref 3.8–4.8)
ALP SERPL-CCNC: 79 IU/L (ref 44–121)
ALT SERPL-CCNC: 28 IU/L (ref 0–44)
AST SERPL-CCNC: 23 IU/L (ref 0–40)
BILIRUB SERPL-MCNC: 0.7 MG/DL (ref 0–1.2)
BUN SERPL-MCNC: 19 MG/DL (ref 8–27)
BUN/CREAT SERPL: 12 (ref 10–24)
CALCIUM SERPL-MCNC: 9.7 MG/DL (ref 8.6–10.2)
CHLORIDE SERPL-SCNC: 102 MMOL/L (ref 96–106)
CO2 SERPL-SCNC: 26 MMOL/L (ref 20–29)
CREAT SERPL-MCNC: 1.6 MG/DL (ref 0.76–1.27)
EGFRCR SERPLBLD CKD-EPI 2021: 45 ML/MIN/1.73
GLOBULIN SER CALC-MCNC: 2.9 G/DL (ref 1.5–4.5)
GLUCOSE SERPL-MCNC: 93 MG/DL (ref 70–99)
MAGNESIUM SERPL-MCNC: 2.1 MG/DL (ref 1.6–2.3)
POTASSIUM SERPL-SCNC: 4.8 MMOL/L (ref 3.5–5.2)
PROT SERPL-MCNC: 7.5 G/DL (ref 6–8.5)
SODIUM SERPL-SCNC: 142 MMOL/L (ref 134–144)

## 2025-05-18 LAB
CHOLEST SERPL-MCNC: 116 MG/DL (ref 100–199)
HDL SERPL QN: 8.3 NM
HDL SERPL-SCNC: 29.2 UMOL/L
HDLC SERPL-MCNC: 36 MG/DL
HLD.LARGE SERPL-SCNC: <1.3 UMOL/L
IMP & REVIEW OF LAB RESULTS: NORMAL
LDL SERPL QN: 20.8 NM
LDL SERPL QN: 20.8 NM
LDL SERPL-SCNC: 830 NMOL/L
LDL SMALL SERPL-SCNC: 303 NMOL/L
LDL SMALL SERPL-SCNC: 303 NMOL/L
LDLC SERPL CALC-MCNC: 61 MG/DL (ref 0–99)
LP-IR SCORE SERPL: 69
REPORT: NORMAL
REPORT: NORMAL
TRIGL SERPL-MCNC: 99 MG/DL (ref 0–149)
VLDL LARGE SERPL-SCNC: 2 NMOL/L
VLDL SERPL QN: 52.8 NM

## 2025-05-19 ENCOUNTER — RESULTS FOLLOW-UP (OUTPATIENT)
Age: 75
End: 2025-05-19

## 2025-05-22 ENCOUNTER — OFFICE VISIT (OUTPATIENT)
Age: 75
End: 2025-05-22
Payer: MEDICARE

## 2025-05-22 VITALS
RESPIRATION RATE: 17 BRPM | BODY MASS INDEX: 28.5 KG/M2 | HEIGHT: 69 IN | OXYGEN SATURATION: 99 % | DIASTOLIC BLOOD PRESSURE: 70 MMHG | HEART RATE: 70 BPM | SYSTOLIC BLOOD PRESSURE: 130 MMHG

## 2025-05-22 DIAGNOSIS — Z95.5 PRESENCE OF CORONARY ANGIOPLASTY IMPLANT AND GRAFT: ICD-10-CM

## 2025-05-22 DIAGNOSIS — N18.31 STAGE 3A CHRONIC KIDNEY DISEASE (HCC): ICD-10-CM

## 2025-05-22 DIAGNOSIS — I25.118 CORONARY ARTERY DISEASE OF NATIVE ARTERY OF NATIVE HEART WITH STABLE ANGINA PECTORIS: Primary | ICD-10-CM

## 2025-05-22 DIAGNOSIS — I10 ESSENTIAL HYPERTENSION: ICD-10-CM

## 2025-05-22 DIAGNOSIS — E78.5 DYSLIPIDEMIA: ICD-10-CM

## 2025-05-22 PROCEDURE — 1126F AMNT PAIN NOTED NONE PRSNT: CPT

## 2025-05-22 PROCEDURE — 1159F MED LIST DOCD IN RCRD: CPT

## 2025-05-22 PROCEDURE — 99214 OFFICE O/P EST MOD 30 MIN: CPT

## 2025-05-22 PROCEDURE — 1160F RVW MEDS BY RX/DR IN RCRD: CPT

## 2025-05-22 PROCEDURE — 1123F ACP DISCUSS/DSCN MKR DOCD: CPT

## 2025-05-22 PROCEDURE — 3075F SYST BP GE 130 - 139MM HG: CPT

## 2025-05-22 PROCEDURE — 3078F DIAST BP <80 MM HG: CPT

## 2025-05-22 NOTE — PROGRESS NOTES
Primary Cardiologist:  Froy Núñez MD. [facility]          Patient: Lex Gonzalez  : 1950      Today's Date: 2025        HISTORY OF PRESENT ILLNESS:     History of Present Illness:    No sig CP or SOB.    Denies chest pain, edema, syncope, shortness of breath at rest, dyspnea on exertion, PND or orthopnea.  Has no tachycardia, palpitations or sense of arrhythmia.        PAST MEDICAL HISTORY:     Past Medical History:   Diagnosis Date    CAD (coronary artery disease) ,     hx of coronary stent- singe vessel- LAD    Cancer (HCC)     prostate    Essential hypertension     GERD (gastroesophageal reflux disease)     Hyperlipidemia     Sleep apnea     CPAP complaint       Past Surgical History:   Procedure Laterality Date    CARDIAC CATHETERIZATION  , & 2009    LAD    CATARACT REMOVAL Bilateral     PTCA      Stent of the LAD in  and again in  with a 3 x 28 mm on 09 post dilated to 3.25    UROLOGICAL SURGERY  10/2016    prostate biopsy             CURRENT MEDICATIONS:    .  Current Outpatient Medications   Medication Sig Dispense Refill    nitroGLYCERIN (NITROSTAT) 0.4 MG SL tablet Place 1 tablet under the tongue every 5 minutes as needed for Chest pain up to max of 3 total doses. If no relief after 1 dose, call 911. 25 tablet 1    rosuvastatin (CRESTOR) 10 MG tablet Take 1 tablet by mouth nightly 90 tablet 3    amLODIPine (NORVASC) 5 MG tablet Take 1 tablet by mouth daily      ASPIRIN 81 PO Take by mouth      pantoprazole (PROTONIX) 40 MG tablet Take 1 tablet by mouth daily (with breakfast)       No current facility-administered medications for this visit.       No Known Allergies      SOCIAL HISTORY:     Social History     Tobacco Use    Smoking status: Former     Current packs/day: 1.00     Average packs/day: 1 pack/day for 26.4 years (26.4 ttl pk-yrs)     Types: Cigarettes     Start date: 1999     Quit date: 1965     Passive exposure: Never    Smokeless tobacco:

## 2025-05-22 NOTE — PROGRESS NOTES
had concerns including Coronary Artery Disease and Hypertension (CHOL).    Vitals:    05/22/25 1403   BP: 130/70   BP Site: Left Upper Arm   Patient Position: Sitting   Pulse: 70   Resp: 17   SpO2: 99%   Height: 1.753 m (5' 9\")        Chest pain No    Refills No        1. Have you been to the ER, urgent care clinic since your last visit? No       Hospitalized since your last visit? No       Where?        Date?

## 2025-06-20 RX ORDER — AMLODIPINE BESYLATE 5 MG/1
5 TABLET ORAL DAILY
Qty: 90 TABLET | Refills: 1 | Status: SHIPPED | OUTPATIENT
Start: 2025-06-20

## 2025-08-27 RX ORDER — ROSUVASTATIN CALCIUM 10 MG/1
10 TABLET, COATED ORAL NIGHTLY
Qty: 90 TABLET | Refills: 3 | Status: SHIPPED | OUTPATIENT
Start: 2025-08-27